# Patient Record
Sex: MALE | Race: WHITE | NOT HISPANIC OR LATINO | Employment: FULL TIME | URBAN - METROPOLITAN AREA
[De-identification: names, ages, dates, MRNs, and addresses within clinical notes are randomized per-mention and may not be internally consistent; named-entity substitution may affect disease eponyms.]

---

## 2017-03-18 ENCOUNTER — OFFICE VISIT (OUTPATIENT)
Dept: URGENT CARE | Facility: URGENT CARE | Age: 32
End: 2017-03-18
Payer: COMMERCIAL

## 2017-03-18 VITALS
OXYGEN SATURATION: 99 % | HEART RATE: 74 BPM | SYSTOLIC BLOOD PRESSURE: 113 MMHG | TEMPERATURE: 99.4 F | DIASTOLIC BLOOD PRESSURE: 70 MMHG | WEIGHT: 172.5 LBS

## 2017-03-18 DIAGNOSIS — K40.90 UNILATERAL INGUINAL HERNIA WITHOUT OBSTRUCTION OR GANGRENE, RECURRENCE NOT SPECIFIED: ICD-10-CM

## 2017-03-18 DIAGNOSIS — J02.9 SORE THROAT: Primary | ICD-10-CM

## 2017-03-18 DIAGNOSIS — J10.1 INFLUENZA DUE TO INFLUENZA VIRUS, TYPE B: ICD-10-CM

## 2017-03-18 LAB
DEPRECATED S PYO AG THROAT QL EIA: NORMAL
FLUAV+FLUBV AG SPEC QL: ABNORMAL
FLUAV+FLUBV AG SPEC QL: NEGATIVE
MICRO REPORT STATUS: NORMAL
SPECIMEN SOURCE: ABNORMAL
SPECIMEN SOURCE: NORMAL

## 2017-03-18 PROCEDURE — 87880 STREP A ASSAY W/OPTIC: CPT | Performed by: FAMILY MEDICINE

## 2017-03-18 PROCEDURE — 87081 CULTURE SCREEN ONLY: CPT | Performed by: FAMILY MEDICINE

## 2017-03-18 PROCEDURE — 87804 INFLUENZA ASSAY W/OPTIC: CPT | Performed by: FAMILY MEDICINE

## 2017-03-18 PROCEDURE — 99203 OFFICE O/P NEW LOW 30 MIN: CPT | Performed by: FAMILY MEDICINE

## 2017-03-18 RX ORDER — BENZONATATE 100 MG/1
100 CAPSULE ORAL 3 TIMES DAILY PRN
Qty: 42 CAPSULE | Refills: 0 | Status: SHIPPED | OUTPATIENT
Start: 2017-03-18 | End: 2017-04-06

## 2017-03-18 NOTE — MR AVS SNAPSHOT
After Visit Summary   3/18/2017    Raul Aviles    MRN: [id number]           Patient Information     Date Of Birth          1985        Visit Information        Provider Department      3/18/2017 10:00 AM Peter Hopson MD [facility]        Today's Diagnoses     Sore throat    -  1    Influenza due to influenza virus, type B        Unilateral inguinal hernia without obstruction or gangrene, recurrence not specified          Care Instructions      Influenza  Influenza ( the flu ) is an infection that affects your respiratory tract (the mouth, nose, and lungs, and the passages between them). Unlike a cold, the flu can make you very ill. And it can lead to pneumonia, a serious lung infection. For some people, especially older adults, young children, and people with certain chronic conditions, the flu can have serious complications and even be fatal.  What Are the Risk Factors for the Flu?     Viruses that cause influenza spread through the air in droplets when someone who has the flu coughs, sneezes, laughs, or talks.   Anyone can get the flu. But you re more likely to become infected if you:    Have a weakened immune system.    Work in a health care setting where you may be exposed to flu germs.    Live or work with someone who has the flu.    Haven t received an annual flu shot.  How Does the Flu Spread?  The flu is caused by viruses. The viruses spread through the air in droplets when someone who has the flu coughs, sneezes, laughs, or talks. You can become infected when you inhale these viruses directly. You can also become infected when you touch a surface on which the droplets have landed and then transfer the germs to your eyes, nose, or mouth. Touching used tissues, or sharing utensils, drinking glasses, or a toothbrush with an infected person can expose you to flu viruses, too.  What Are the Symptoms of the Flu?  Flu symptoms tend to come on quickly and may last a few days  to a few weeks. They include:    Fever usually higher than 101 F  (38.3 C) and chills    Sore throat and headache    Dry cough    Runny nose    Tiredness and weakness    Muscle aches  Factors That Can Make Flu Worse  For some people, the flu can be very serious. The risk of complications is greater for:    Children under age 5.    Adults 65 years of age and older.    People with a chronic illness, such as diabetes or heart, kidney, or lung disease.    People who live in a nursing home or long-term care facility.   How Is the Flu Treated?  Influenza usually improves after 7 days or so. In some cases, your health care provider may prescribe an antiviral medication. This may help you get well sooner. For the medication to help, you need to take it as soon as possible (ideally within 48 hours) after your symptoms start. If you develop pneumonia or other serious illness, hospital care may be needed.  Easing Flu Symptoms    Drink lots of fluids such as water, juice, and warm soup. A good rule is to drink enough so that you urinate your normal amount.    Get plenty of rest.    Ask your health care provider what to take for fever and pain.    Call your provider if your fever rises over 101 F (38.3 C) or you become dizzy, lightheaded, or short of breath.  Taking Steps to Protect Others    Wash your hands often, especially after coughing or sneezing. Or, clean your hands with an alcohol-based hand  containing at least 60 percent alcohol.    Cough or sneeze into a tissue. Then throw the tissue away and wash your hands. If you don t have a tissue, cough and sneeze into the crook of your elbow.    Stay home until at least 24 hours after you no longer have a fever or chills. Be sure the fever isn t being hidden by fever-reducing medication.    Don t share food, utensils, drinking glasses, or a toothbrush with others.    Ask your health care provider if others in your household should receive antiviral medication to help  them avoid infection.  How Can the Flu Be Prevented?    One of the best ways to avoid the flu is to get a flu vaccination each year. Viruses that cause the flu change from year to year. For that reason, doctors recommend getting the flu vaccine each year, as soon as it's available in your area. The vaccine may be given as a shot or as a nasal spray. Your health care provider can tell you which vaccine is right for you.    Wash your hands often. Frequent handwashing is a proven way to help prevent infection.    Carry an alcohol-based hand gel containing at least 60 percent alcohol. Use it when you don t have access to soap and water. Then wash your hands as soon as you can.    Avoid touching your eyes, nose, and mouth.    At home and work, clean phones, computer keyboards, and toys often with disinfectant wipes.    If possible, avoid close contact with others who have the flu or symptoms of the flu.  Handwashing Tips  Handwashing is one of the best ways to prevent many common infections. If you re caring for or visiting someone with the flu, wash your hands each time you enter and leave the room. Follow these steps:    Use warm water and plenty of soap. Rub your hands together well.    Clean the whole hand, under your nails, between your fingers, and up the wrists.    Wash for at least 15 seconds.    Rinse, letting the water run down your fingers, not up your wrists.    Dry your hands well. Use a paper towel to turn off the faucet and open the door.  Using Alcohol-Based Hand   Alcohol-based hand  are also a good choice. Use them when you don t have access to soap and water. Follow these steps:    Squeeze about a tablespoon of gel into the palm of one hand.    Rub your hands together briskly, cleaning the backs of your hands, the palms, between your fingers, and up the wrists.    Rub until the gel is gone and your hands are completely dry.  Preventing Influenza in Healthcare Settings  The flu is a  special concern for people in hospitals and long-term care facilities. To help prevent the spread of flu, many hospitals and nursing homes take these steps:    Health care providers wash their hands or use an alcohol-based hand  before and after treating each patient.    People with the flu have private rooms and bathrooms or share a room with someone with the same infection.    High-risk patients who don t have the flu are encouraged to get the flu and pneumonia vaccines.    All health care workers are encouraged or required to get flu shots.        9800-0147 [employer]. [address] [place] [address]. All rights reserved. This information is not intended as a substitute for professional medical care. Always follow your healthcare professional's instructions.        Patient Education    Benzonatate Oral capsule    Benzonatate Oral capsule, liquid filled  Benzonatate Oral capsule  What is this medicine?  BENZONATATE (brittaney ADRIÁN na colbert) is used to treat cough.  This medicine may be used for other purposes; ask your health care provider or pharmacist if you have questions.  What should I tell my health care provider before I take this medicine?  They need to know if you have any of these conditions:    kidney or liver disease    an unusual or allergic reaction to benzonatate, anesthetics, other medicines, foods, dyes, or preservatives    pregnant or trying to get pregnant    breast-feeding  How should I use this medicine?  Take this medicine by mouth with a glass of water. Follow the directions on the prescription label. Avoid breaking, chewing, or sucking the capsule, as this can cause serious side effects. Take your medicine at regular intervals. Do not take your medicine more often than directed.  Talk to your pediatrician regarding the use of this medicine in children. While this drug may be prescribed for children as young as 10 years old for selected conditions, precautions do  apply.  Overdosage: If you think you have taken too much of this medicine contact a poison control center or emergency room at once.  NOTE: This medicine is only for you. Do not share this medicine with others.  What if I miss a dose?  If you miss a dose, take it as soon as you can. If it is almost time for your next dose, take only that dose. Do not take double or extra doses.  What may interact with this medicine?  Do not take this medicine with any of the following medications:    MAOIs like Carbex, Eldepryl, Marplan, Nardil, and Parnate  This list may not describe all possible interactions. Give your health care provider a list of all the medicines, herbs, non-prescription drugs, or dietary supplements you use. Also tell them if you smoke, drink alcohol, or use illegal drugs. Some items may interact with your medicine.  What should I watch for while using this medicine?  Tell your doctor if your symptoms do not improve or if they get worse. If you have a high fever, skin rash, or headache, see your health care professional.  You may get drowsy or dizzy. Do not drive, use machinery, or do anything that needs mental alertness until you know how this medicine affects you. Do not sit or stand up quickly, especially if you are an older patient. This reduces the risk of dizzy or fainting spells.  What side effects may I notice from receiving this medicine?  Side effects that you should report to your doctor or health care professional as soon as possible:    allergic reactions like skin rash, itching or hives, swelling of the face, lips, or tongue    breathing problems    chest pain    confusion or hallucinations    irregular heartbeat    numbness of mouth or throat    seizures  Side effects that usually do not require medical attention (report to your doctor or health care professional if they continue or are bothersome):    burning feeling in the eyes    constipation    headache    nasal congestion    stomach  upset  This list may not describe all possible side effects. Call your doctor for medical advice about side effects. You may report side effects to FDA at 7-072-OAS-6612.  Where should I keep my medicine?  Keep out of the reach of children.  Store at room temperature between 15 and 30 degrees C (59 and 86 degrees F). Keep tightly closed. Protect from light and moisture. Throw away any unused medicine after the expiration date.  NOTE:This sheet is a summary. It may not cover all possible information. If you have questions about this medicine, talk to your doctor, pharmacist, or health care provider. Copyright  2016 [employer] Standard        Hernia (Adult)    A hernia can happen when there is a weakness or defect in the wall of the abdomen or groin. Intestines or nearby tissues may move from their usual location and push through the weakness in the wall. This can cause a hernia (bulge) you may see or feel.  Causes and Risk Factors   A hernia may be present at birth. Or it may be caused by the wear and tear of daily living. Certain factors can make a hernia more likely. These can include:    Heavy lifting    Straining, whether from lifting, movement, or constipation    Chronic cough    Injury to the abdominal wall    Excess weight    Pregnancy    Prior surgery    Older age    Family history of hernia  Symptoms  Symptoms of a hernia may come on suddenly. Or they may appear slowly over time. Some common symptoms include:    Bulge in the groin area, around the navel, or in the scrotum (the bulge may get bigger when you stand and go away when you lie down)    Pain or pressure around the bulge    Pain during activities such as lifting, coughing, or sneezing    A feeling of weakness or pressure in the groin    Pain or swelling in the scrotum  Types of hernias  There are different types of hernia. The type you have depends on its location:    Inguinal: This type is in the groin or scrotum. It is more common in men.    Femoral:  This type is in the groin, upper thigh (where the leg bends), or labia. It is more common in women.    Ventral: This type is in the abdominal wall.    Umbilical: This type occurs around the navel (belly button).    Incisional: This type occurs at the site of a previous surgery.  The condition of the hernia can help determine how urgently it needs to be treated.    Reducible: It goes back in by itself, or it can be pushed back in.    Irreducible: It can t be pushed back in.    Incarcerated/Strangulated: The intestine is trapped (incarcerated). If this happens, you won t be able to push the bulge back in. If the incarcerated hernia isn t treated, it may become strangulated. This means the area loses blood supply and the tissue may die. This requires emergency surgery! Treatment is needed right away!  In most cases, a hernia will not heal on its own. Surgery is usually needed to repair the defect in the abdominal wall or groin. You ll be told more about surgery, if needed.  If your symptoms are not severe, treatment may sometimes be delayed. In such cases, regular follow-up visits with the provider will be needed. You ll be asked to keep track of your symptoms and to watch for signs of more serious problems. You may also be given guidelines similar to the home care instructions below.  Home Care  To help keep a hernia from getting worse, you may be advised to:    Avoid heavy lifting and straining as directed.    Take steps to prevent constipation, such as eating more fiber and drinking more water. This may help reduce straining that can occur when having a bowel movement. Reducing straining may help keep your symptoms from getting worse.    Maintain a healthy weight or lose excess weight. This can help reduce strain on abdominal muscles and tissues.    Stop smoking. This can help prevent coughing that may also strain abdominal muscles and tissues.  Follow-up care  Follow up with your healthcare provider, or as  directed. If imaging tests were done, they will be reviewed a doctor. You will be told the results and any new findings that may affect your care.  When to seek medical advice  Call your healthcare provider right away if any of these occur:    Hernia hardens, swells, or grows larger    Hernia can no longer be pushed back in    Pain moves to the lower right abdomen (just below the waistline), or spreads to the back  Call 911  Call 911 right away if any of these occur:    Nausea and vomiting    Severe pain, redness, or tenderness in the area near the hernia    Pain worsens quickly and doesn t get better    Inability to have a bowel movement or pass gas    Fever of 100.4 F (38 C) or higher    Trouble breathing    Fainting    Rapid heart rate    Vomiting blood    Large amounts of blood in stool    9646-7664 [employer] [facility]. [address] [place] [address], [place] [address]. All rights reserved. This information is not intended as a substitute for professional medical care. Always follow your healthcare professional's instructions.              Follow-ups after your visit        Additional Services     GENERAL SURG ADULT REFERRAL       Your provider has referred you to: [facility]: [facility] [phone]   [email or web address]    Please be aware that coverage of these services is subject to the terms and limitations of your health insurance plan.  Call member services at your health plan with any benefit or coverage questions.      Please bring the following with you to your appointment:    (1) Any X-Rays, CTs or MRIs which have been performed.  Contact the facility where they were done to arrange for  prior to your scheduled appointment.   (2) List of current medications   (3) This referral request   (4) Any documents/labs given to you for this referral                  Who to contact     If you have questions or need follow up information about today's clinic visit or  "your schedule please contact [facility] directly at [phone].  Normal or non-critical lab and imaging results will be communicated to you by MyChart, letter or phone within 4 business days after the clinic has received the results. If you do not hear from us within 7 days, please contact the clinic through MyChart or phone. If you have a critical or abnormal lab result, we will notify you by phone as soon as possible.  Submit refill requests through [employer] or call your pharmacy and they will forward the refill request to us. Please allow 3 business days for your refill to be completed.          Additional Information About Your Visit        [employer] Information     [employer] lets you send messages to your doctor, view your test results, renew your prescriptions, schedule appointments and more. To sign up, go to [email or web address] . Click on \"Log in\" on the left side of the screen, which will take you to the Welcome page. Then click on \"Sign up Now\" on the right side of the page.     You will be asked to enter the access code listed below, as well as some personal information. Please follow the directions to create your username and password.     Your access code is: [email or web address]  Expires: 2017 10:56 AM     Your access code will  in 90 days. If you need help or a new code, please call your [place] clinic or [phone].        Care EveryWhere ID     This is your Care EveryWhere ID. This could be used by other organizations to access your [place] medical records  [id number]        Your Vitals Were     Pulse Temperature Pulse Oximetry             74 99.4  F (37.4  C) (Tympanic) 99%          Blood Pressure from Last 3 Encounters:   17 113/70    Weight from Last 3 Encounters:   17 172 lb 8 oz (78.2 kg)              We Performed the Following     Beta strep group A culture     GENERAL SURG ADULT REFERRAL     Influenza A/B antigen     Rapid strep screen          Today's " Medication Changes          These changes are accurate as of: 3/18/17 10:56 AM.  If you have any questions, ask your nurse or doctor.               Start taking these medicines.        Dose/Directions    benzonatate 100 MG capsule   Commonly known as:  TESSALON   Used for:  Influenza due to influenza virus, type B   Started by:  Peter Hopson MD        Dose:  100 mg   Take 1 capsule (100 mg) by mouth 3 times daily as needed for cough   Quantity:  42 capsule   Refills:  0            Where to get your medicines      These medications were sent to [id number] - [place], [place] - [address] [email or web address] [facility]  [address] [place] [address]    Hours:  24-hours Phone:  [phone]     benzonatate 100 MG capsule                Primary Care Provider Office Phone # Fax #    [facility] [phone] [address] [address] [facility]. [facility] [address]        Thank you!     Thank you for choosing [facility]  for your care. Our goal is always to provide you with excellent care. Hearing back from our patients is one way we can continue to improve our services. Please take a few minutes to complete the written survey that you may receive in the mail after your visit with us. Thank you!             Your Updated Medication List - Protect others around you: Learn how to safely use, store and throw away your medicines at www.disposemymeds.org.          This list is accurate as of: 3/18/17 10:56 AM.  Always use your most recent med list.                   Brand Name Dispense Instructions for use    benzonatate 100 MG capsule    TESSALON    42 capsule    Take 1 capsule (100 mg) by mouth 3 times daily as needed for cough

## 2017-03-18 NOTE — NURSING NOTE
Chief Complaint   Patient presents with     Sick       Initial /70  Pulse 74  Temp 99.4  F (37.4  C) (Tympanic)  Wt 172 lb 8 oz (78.2 kg)  SpO2 99% There is no height or weight on file to calculate BMI.  Medication Reconciliation: complete       KARTHIKEYAN Barrera

## 2017-03-18 NOTE — LETTER
[facility]  [address] [facility] [place] [address]        March 20, 2017    Raul Aviles  [address]  [facility] [place] [address]            Dear Raul,    The results of your recent throat culture was normal.  Below is a copy of the results.  It was a pleasure to see you at your last appointment.    If you have any questions or concerns, please call myself or my nurse at [phone].    Sincerely,    Peter Hopson MD/ks    Results for orders placed or performed in visit on 03/18/17   Influenza A/B antigen   Result Value Ref Range    Influenza A/B Agn Specimen Nasopharyngeal     Influenza A Negative NEG    Influenza B (A) NEG     Positive   Test results must be correlated with clinical data. If necessary, results   should be confirmed by a molecular assay or viral culture.     Rapid strep screen   Result Value Ref Range    Specimen Description Throat     Rapid Strep A Screen       NEGATIVE: No Group A streptococcal antigen detected by immunoassay, await   culture report.      Micro Report Status FINAL 03/18/2017    Beta strep group A culture   Result Value Ref Range    Specimen Description Throat     Culture Micro No Beta Streptococcus isolated     Micro Report Status FINAL 03/20/2017

## 2017-03-18 NOTE — PROGRESS NOTES
SUBJECTIVE:                                                    Raul Aviles is a 31 year old male who presents to clinic today for the following health issues:      RESPIRATORY SYMPTOMS      Duration: 1 week     Description  Cough, headache, sore throat, body aches, chills, sweats, fatigue, weak    Severity: moderate    Accompanying signs and symptoms: None    History (predisposing factors):  none    Precipitating or alleviating factors: daughter is sick, sx's are worse in the am and pm    Therapies tried and outcome:  Ibuprofen with temporary relief        He also complains of left inguinal bulge which he is noticing for few months but worsened during the last 5 days. He denies any bowel, bladder or other relevant systemic symptoms. He has not seen any doctor for about 3 years.       Problem list and histories reviewed & adjusted, as indicated.  Additional history: as documented    There is no problem list on file for this patient.    No past surgical history on file.    Social History   Substance Use Topics     Smoking status: Never Smoker     Smokeless tobacco: Not on file     Alcohol use Not on file     No family history on file.      No current outpatient prescriptions on file.     No Known Allergies  No lab results found.   BP Readings from Last 3 Encounters:   03/18/17 113/70    Wt Readings from Last 3 Encounters:   03/18/17 172 lb 8 oz (78.2 kg)                  Labs reviewed in EPIC    ROS:  10 point ROS of systems including Constitutional, Eyes, Respiratory, Cardiovascular, Gastroenterology, Genitourinary, Integumentary, Muscularskeletal, Psychiatric were all negative except for pertinent positives noted in my HPI.    OBJECTIVE:                                                    /70  Pulse 74  Temp 99.4  F (37.4  C) (Tympanic)  Wt 172 lb 8 oz (78.2 kg)  SpO2 99%  There is no height or weight on file to calculate BMI.  GENERAL: healthy, alert and no distress  EYES: Eyes grossly normal to  inspection, PERRL and conjunctivae and sclerae normal  HENT: ear canals and TM's normal, nose and mouth without ulcers or lesions  NECK: no adenopathy, no asymmetry, masses, or scars and thyroid normal to palpation  RESP: lungs clear to auscultation - no rales, rhonchi or wheezes  CV: regular rates and rhythm, normal S1 S2, no S3 or S4 and no murmur, click or rub  ABDOMEN: soft, nontender, bowel sounds normal and hernia left inguinal, reducible   MS: no gross musculoskeletal defects noted, no edema    Diagnostic Test Results:  Results for orders placed or performed in visit on 03/18/17 (from the past 24 hour(s))   Influenza A/B antigen   Result Value Ref Range    Influenza A/B Agn Specimen Nasopharyngeal     Influenza A Negative NEG    Influenza B (A) NEG     Positive   Test results must be correlated with clinical data. If necessary, results   should be confirmed by a molecular assay or viral culture.     Rapid strep screen   Result Value Ref Range    Specimen Description Throat     Rapid Strep A Screen       NEGATIVE: No Group A streptococcal antigen detected by immunoassay, await   culture report.      Micro Report Status FINAL 03/18/2017         ASSESSMENT/PLAN:                                                        ICD-10-CM    1. Sore throat J02.9 Influenza A/B antigen     Rapid strep screen     Beta strep group A culture   2. Influenza due to influenza virus, type B J10.1    3. Unilateral inguinal hernia without obstruction or gangrene, recurrence not specified K40.90      Discussed in detail differentials and further management. Cold like symptoms are secondary to influenza B, antiviral not indicated. Recommended well hydration, over-the-counter analgesia, warm fluids and rest. Tessalon prescribed for cough control. General surgery referral placed for left inguinal hernia. Suggested to avoid lifting heavy weights and constipation. Written instructions/information provided. Patient understood and in agreement  with the above plan. All questions are answered. Suggested to establish with PCP at earliest convenience.          MEDICATIONS:   Orders Placed This Encounter   Medications     benzonatate (TESSALON) 100 MG capsule     Sig: Take 1 capsule (100 mg) by mouth 3 times daily as needed for cough     Dispense:  42 capsule     Refill:  0          - Continue other medications without change  Patient Instructions       Influenza  Influenza ( the flu ) is an infection that affects your respiratory tract (the mouth, nose, and lungs, and the passages between them). Unlike a cold, the flu can make you very ill. And it can lead to pneumonia, a serious lung infection. For some people, especially older adults, young children, and people with certain chronic conditions, the flu can have serious complications and even be fatal.  What Are the Risk Factors for the Flu?     Viruses that cause influenza spread through the air in droplets when someone who has the flu coughs, sneezes, laughs, or talks.   Anyone can get the flu. But you re more likely to become infected if you:    Have a weakened immune system.    Work in a health care setting where you may be exposed to flu germs.    Live or work with someone who has the flu.    Haven t received an annual flu shot.  How Does the Flu Spread?  The flu is caused by viruses. The viruses spread through the air in droplets when someone who has the flu coughs, sneezes, laughs, or talks. You can become infected when you inhale these viruses directly. You can also become infected when you touch a surface on which the droplets have landed and then transfer the germs to your eyes, nose, or mouth. Touching used tissues, or sharing utensils, drinking glasses, or a toothbrush with an infected person can expose you to flu viruses, too.  What Are the Symptoms of the Flu?  Flu symptoms tend to come on quickly and may last a few days to a few weeks. They include:    Fever usually higher than 101 F   (38.3 C) and chills    Sore throat and headache    Dry cough    Runny nose    Tiredness and weakness    Muscle aches  Factors That Can Make Flu Worse  For some people, the flu can be very serious. The risk of complications is greater for:    Children under age 5.    Adults 65 years of age and older.    People with a chronic illness, such as diabetes or heart, kidney, or lung disease.    People who live in a nursing home or long-term care facility.   How Is the Flu Treated?  Influenza usually improves after 7 days or so. In some cases, your health care provider may prescribe an antiviral medication. This may help you get well sooner. For the medication to help, you need to take it as soon as possible (ideally within 48 hours) after your symptoms start. If you develop pneumonia or other serious illness, hospital care may be needed.  Easing Flu Symptoms    Drink lots of fluids such as water, juice, and warm soup. A good rule is to drink enough so that you urinate your normal amount.    Get plenty of rest.    Ask your health care provider what to take for fever and pain.    Call your provider if your fever rises over 101 F (38.3 C) or you become dizzy, lightheaded, or short of breath.  Taking Steps to Protect Others    Wash your hands often, especially after coughing or sneezing. Or, clean your hands with an alcohol-based hand  containing at least 60 percent alcohol.    Cough or sneeze into a tissue. Then throw the tissue away and wash your hands. If you don t have a tissue, cough and sneeze into the crook of your elbow.    Stay home until at least 24 hours after you no longer have a fever or chills. Be sure the fever isn t being hidden by fever-reducing medication.    Don t share food, utensils, drinking glasses, or a toothbrush with others.    Ask your health care provider if others in your household should receive antiviral medication to help them avoid infection.  How Can the Flu Be Prevented?    One of the  best ways to avoid the flu is to get a flu vaccination each year. Viruses that cause the flu change from year to year. For that reason, doctors recommend getting the flu vaccine each year, as soon as it's available in your area. The vaccine may be given as a shot or as a nasal spray. Your health care provider can tell you which vaccine is right for you.    Wash your hands often. Frequent handwashing is a proven way to help prevent infection.    Carry an alcohol-based hand gel containing at least 60 percent alcohol. Use it when you don t have access to soap and water. Then wash your hands as soon as you can.    Avoid touching your eyes, nose, and mouth.    At home and work, clean phones, computer keyboards, and toys often with disinfectant wipes.    If possible, avoid close contact with others who have the flu or symptoms of the flu.  Handwashing Tips  Handwashing is one of the best ways to prevent many common infections. If you re caring for or visiting someone with the flu, wash your hands each time you enter and leave the room. Follow these steps:    Use warm water and plenty of soap. Rub your hands together well.    Clean the whole hand, under your nails, between your fingers, and up the wrists.    Wash for at least 15 seconds.    Rinse, letting the water run down your fingers, not up your wrists.    Dry your hands well. Use a paper towel to turn off the faucet and open the door.  Using Alcohol-Based Hand   Alcohol-based hand  are also a good choice. Use them when you don t have access to soap and water. Follow these steps:    Squeeze about a tablespoon of gel into the palm of one hand.    Rub your hands together briskly, cleaning the backs of your hands, the palms, between your fingers, and up the wrists.    Rub until the gel is gone and your hands are completely dry.  Preventing Influenza in Healthcare Settings  The flu is a special concern for people in hospitals and long-term care facilities.  To help prevent the spread of flu, many hospitals and nursing homes take these steps:    Health care providers wash their hands or use an alcohol-based hand  before and after treating each patient.    People with the flu have private rooms and bathrooms or share a room with someone with the same infection.    High-risk patients who don t have the flu are encouraged to get the flu and pneumonia vaccines.    All health care workers are encouraged or required to get flu shots.        5486-5557 [employer]. [address] [place] [address]. All rights reserved. This information is not intended as a substitute for professional medical care. Always follow your healthcare professional's instructions.        Patient Education    Benzonatate Oral capsule    Benzonatate Oral capsule, liquid filled  Benzonatate Oral capsule  What is this medicine?  BENZONATATE (brittaney ADRIÁN na colbert) is used to treat cough.  This medicine may be used for other purposes; ask your health care provider or pharmacist if you have questions.  What should I tell my health care provider before I take this medicine?  They need to know if you have any of these conditions:    kidney or liver disease    an unusual or allergic reaction to benzonatate, anesthetics, other medicines, foods, dyes, or preservatives    pregnant or trying to get pregnant    breast-feeding  How should I use this medicine?  Take this medicine by mouth with a glass of water. Follow the directions on the prescription label. Avoid breaking, chewing, or sucking the capsule, as this can cause serious side effects. Take your medicine at regular intervals. Do not take your medicine more often than directed.  Talk to your pediatrician regarding the use of this medicine in children. While this drug may be prescribed for children as young as 10 years old for selected conditions, precautions do apply.  Overdosage: If you think you have taken too much of this medicine  contact a poison control center or emergency room at once.  NOTE: This medicine is only for you. Do not share this medicine with others.  What if I miss a dose?  If you miss a dose, take it as soon as you can. If it is almost time for your next dose, take only that dose. Do not take double or extra doses.  What may interact with this medicine?  Do not take this medicine with any of the following medications:    MAOIs like Carbex, Eldepryl, Marplan, Nardil, and Parnate  This list may not describe all possible interactions. Give your health care provider a list of all the medicines, herbs, non-prescription drugs, or dietary supplements you use. Also tell them if you smoke, drink alcohol, or use illegal drugs. Some items may interact with your medicine.  What should I watch for while using this medicine?  Tell your doctor if your symptoms do not improve or if they get worse. If you have a high fever, skin rash, or headache, see your health care professional.  You may get drowsy or dizzy. Do not drive, use machinery, or do anything that needs mental alertness until you know how this medicine affects you. Do not sit or stand up quickly, especially if you are an older patient. This reduces the risk of dizzy or fainting spells.  What side effects may I notice from receiving this medicine?  Side effects that you should report to your doctor or health care professional as soon as possible:    allergic reactions like skin rash, itching or hives, swelling of the face, lips, or tongue    breathing problems    chest pain    confusion or hallucinations    irregular heartbeat    numbness of mouth or throat    seizures  Side effects that usually do not require medical attention (report to your doctor or health care professional if they continue or are bothersome):    burning feeling in the eyes    constipation    headache    nasal congestion    stomach upset  This list may not describe all possible side effects. Call your doctor  for medical advice about side effects. You may report side effects to FDA at 7-482-ZRK-8596.  Where should I keep my medicine?  Keep out of the reach of children.  Store at room temperature between 15 and 30 degrees C (59 and 86 degrees F). Keep tightly closed. Protect from light and moisture. Throw away any unused medicine after the expiration date.  NOTE:This sheet is a summary. It may not cover all possible information. If you have questions about this medicine, talk to your doctor, pharmacist, or health care provider. Copyright  2016 [employer]        Hernia (Adult)    A hernia can happen when there is a weakness or defect in the wall of the abdomen or groin. Intestines or nearby tissues may move from their usual location and push through the weakness in the wall. This can cause a hernia (bulge) you may see or feel.  Causes and Risk Factors   A hernia may be present at birth. Or it may be caused by the wear and tear of daily living. Certain factors can make a hernia more likely. These can include:    Heavy lifting    Straining, whether from lifting, movement, or constipation    Chronic cough    Injury to the abdominal wall    Excess weight    Pregnancy    Prior surgery    Older age    Family history of hernia  Symptoms  Symptoms of a hernia may come on suddenly. Or they may appear slowly over time. Some common symptoms include:    Bulge in the groin area, around the navel, or in the scrotum (the bulge may get bigger when you stand and go away when you lie down)    Pain or pressure around the bulge    Pain during activities such as lifting, coughing, or sneezing    A feeling of weakness or pressure in the groin    Pain or swelling in the scrotum  Types of hernias  There are different types of hernia. The type you have depends on its location:    Inguinal: This type is in the groin or scrotum. It is more common in men.    Femoral: This type is in the groin, upper thigh (where the leg bends), or labia. It is  more common in women.    Ventral: This type is in the abdominal wall.    Umbilical: This type occurs around the navel (belly button).    Incisional: This type occurs at the site of a previous surgery.  The condition of the hernia can help determine how urgently it needs to be treated.    Reducible: It goes back in by itself, or it can be pushed back in.    Irreducible: It can t be pushed back in.    Incarcerated/Strangulated: The intestine is trapped (incarcerated). If this happens, you won t be able to push the bulge back in. If the incarcerated hernia isn t treated, it may become strangulated. This means the area loses blood supply and the tissue may die. This requires emergency surgery! Treatment is needed right away!  In most cases, a hernia will not heal on its own. Surgery is usually needed to repair the defect in the abdominal wall or groin. You ll be told more about surgery, if needed.  If your symptoms are not severe, treatment may sometimes be delayed. In such cases, regular follow-up visits with the provider will be needed. You ll be asked to keep track of your symptoms and to watch for signs of more serious problems. You may also be given guidelines similar to the home care instructions below.  Home Care  To help keep a hernia from getting worse, you may be advised to:    Avoid heavy lifting and straining as directed.    Take steps to prevent constipation, such as eating more fiber and drinking more water. This may help reduce straining that can occur when having a bowel movement. Reducing straining may help keep your symptoms from getting worse.    Maintain a healthy weight or lose excess weight. This can help reduce strain on abdominal muscles and tissues.    Stop smoking. This can help prevent coughing that may also strain abdominal muscles and tissues.  Follow-up care  Follow up with your healthcare provider, or as directed. If imaging tests were done, they will be reviewed a doctor. You will be told  the results and any new findings that may affect your care.  When to seek medical advice  Call your healthcare provider right away if any of these occur:    Hernia hardens, swells, or grows larger    Hernia can no longer be pushed back in    Pain moves to the lower right abdomen (just below the waistline), or spreads to the back  Call 911  Call 911 right away if any of these occur:    Nausea and vomiting    Severe pain, redness, or tenderness in the area near the hernia    Pain worsens quickly and doesn t get better    Inability to have a bowel movement or pass gas    Fever of 100.4 F (38 C) or higher    Trouble breathing    Fainting    Rapid heart rate    Vomiting blood    Large amounts of blood in stool    [id number] [employer]. [address] [place] [address], [place] [id number]. All rights reserved. This information is not intended as a substitute for professional medical care. Always follow your healthcare professional's instructions.            Peter Hopson MD  [facility]

## 2017-03-18 NOTE — PATIENT INSTRUCTIONS
Influenza  Influenza ( the flu ) is an infection that affects your respiratory tract (the mouth, nose, and lungs, and the passages between them). Unlike a cold, the flu can make you very ill. And it can lead to pneumonia, a serious lung infection. For some people, especially older adults, young children, and people with certain chronic conditions, the flu can have serious complications and even be fatal.  What Are the Risk Factors for the Flu?     Viruses that cause influenza spread through the air in droplets when someone who has the flu coughs, sneezes, laughs, or talks.   Anyone can get the flu. But you re more likely to become infected if you:    Have a weakened immune system.    Work in a health care setting where you may be exposed to flu germs.    Live or work with someone who has the flu.    Haven t received an annual flu shot.  How Does the Flu Spread?  The flu is caused by viruses. The viruses spread through the air in droplets when someone who has the flu coughs, sneezes, laughs, or talks. You can become infected when you inhale these viruses directly. You can also become infected when you touch a surface on which the droplets have landed and then transfer the germs to your eyes, nose, or mouth. Touching used tissues, or sharing utensils, drinking glasses, or a toothbrush with an infected person can expose you to flu viruses, too.  What Are the Symptoms of the Flu?  Flu symptoms tend to come on quickly and may last a few days to a few weeks. They include:    Fever usually higher than 101 F  (38.3 C) and chills    Sore throat and headache    Dry cough    Runny nose    Tiredness and weakness    Muscle aches  Factors That Can Make Flu Worse  For some people, the flu can be very serious. The risk of complications is greater for:    Children under age 5.    Adults 65 years of age and older.    People with a chronic illness, such as diabetes or heart, kidney, or lung disease.    People who live in a nursing  home or long-term care facility.   How Is the Flu Treated?  Influenza usually improves after 7 days or so. In some cases, your health care provider may prescribe an antiviral medication. This may help you get well sooner. For the medication to help, you need to take it as soon as possible (ideally within 48 hours) after your symptoms start. If you develop pneumonia or other serious illness, hospital care may be needed.  Easing Flu Symptoms    Drink lots of fluids such as water, juice, and warm soup. A good rule is to drink enough so that you urinate your normal amount.    Get plenty of rest.    Ask your health care provider what to take for fever and pain.    Call your provider if your fever rises over 101 F (38.3 C) or you become dizzy, lightheaded, or short of breath.  Taking Steps to Protect Others    Wash your hands often, especially after coughing or sneezing. Or, clean your hands with an alcohol-based hand  containing at least 60 percent alcohol.    Cough or sneeze into a tissue. Then throw the tissue away and wash your hands. If you don t have a tissue, cough and sneeze into the crook of your elbow.    Stay home until at least 24 hours after you no longer have a fever or chills. Be sure the fever isn t being hidden by fever-reducing medication.    Don t share food, utensils, drinking glasses, or a toothbrush with others.    Ask your health care provider if others in your household should receive antiviral medication to help them avoid infection.  How Can the Flu Be Prevented?    One of the best ways to avoid the flu is to get a flu vaccination each year. Viruses that cause the flu change from year to year. For that reason, doctors recommend getting the flu vaccine each year, as soon as it's available in your area. The vaccine may be given as a shot or as a nasal spray. Your health care provider can tell you which vaccine is right for you.    Wash your hands often. Frequent handwashing is a proven way  to help prevent infection.    Carry an alcohol-based hand gel containing at least 60 percent alcohol. Use it when you don t have access to soap and water. Then wash your hands as soon as you can.    Avoid touching your eyes, nose, and mouth.    At home and work, clean phones, computer keyboards, and toys often with disinfectant wipes.    If possible, avoid close contact with others who have the flu or symptoms of the flu.  Handwashing Tips  Handwashing is one of the best ways to prevent many common infections. If you re caring for or visiting someone with the flu, wash your hands each time you enter and leave the room. Follow these steps:    Use warm water and plenty of soap. Rub your hands together well.    Clean the whole hand, under your nails, between your fingers, and up the wrists.    Wash for at least 15 seconds.    Rinse, letting the water run down your fingers, not up your wrists.    Dry your hands well. Use a paper towel to turn off the faucet and open the door.  Using Alcohol-Based Hand   Alcohol-based hand  are also a good choice. Use them when you don t have access to soap and water. Follow these steps:    Squeeze about a tablespoon of gel into the palm of one hand.    Rub your hands together briskly, cleaning the backs of your hands, the palms, between your fingers, and up the wrists.    Rub until the gel is gone and your hands are completely dry.  Preventing Influenza in Healthcare Settings  The flu is a special concern for people in hospitals and long-term care facilities. To help prevent the spread of flu, many hospitals and nursing homes take these steps:    Health care providers wash their hands or use an alcohol-based hand  before and after treating each patient.    People with the flu have private rooms and bathrooms or share a room with someone with the same infection.    High-risk patients who don t have the flu are encouraged to get the flu and pneumonia  vaccines.    All health care workers are encouraged or required to get flu shots.        5571-1411 [employer]. [address] [place] [address], [place] [address]. All rights reserved. This information is not intended as a substitute for professional medical care. Always follow your healthcare professional's instructions.        Patient Education    Benzonatate Oral capsule    Benzonatate Oral capsule, liquid filled  Benzonatate Oral capsule  What is this medicine?  BENZONATATE (brittaney ADRIÁN na colbert) is used to treat cough.  This medicine may be used for other purposes; ask your health care provider or pharmacist if you have questions.  What should I tell my health care provider before I take this medicine?  They need to know if you have any of these conditions:    kidney or liver disease    an unusual or allergic reaction to benzonatate, anesthetics, other medicines, foods, dyes, or preservatives    pregnant or trying to get pregnant    breast-feeding  How should I use this medicine?  Take this medicine by mouth with a glass of water. Follow the directions on the prescription label. Avoid breaking, chewing, or sucking the capsule, as this can cause serious side effects. Take your medicine at regular intervals. Do not take your medicine more often than directed.  Talk to your pediatrician regarding the use of this medicine in children. While this drug may be prescribed for children as young as 10 years old for selected conditions, precautions do apply.  Overdosage: If you think you have taken too much of this medicine contact a poison control center or emergency room at once.  NOTE: This medicine is only for you. Do not share this medicine with others.  What if I miss a dose?  If you miss a dose, take it as soon as you can. If it is almost time for your next dose, take only that dose. Do not take double or extra doses.  What may interact with this medicine?  Do not take this medicine with any of the following  medications:    MAOIs like Carbex, Eldepryl, Marplan, Nardil, and Parnate  This list may not describe all possible interactions. Give your health care provider a list of all the medicines, herbs, non-prescription drugs, or dietary supplements you use. Also tell them if you smoke, drink alcohol, or use illegal drugs. Some items may interact with your medicine.  What should I watch for while using this medicine?  Tell your doctor if your symptoms do not improve or if they get worse. If you have a high fever, skin rash, or headache, see your health care professional.  You may get drowsy or dizzy. Do not drive, use machinery, or do anything that needs mental alertness until you know how this medicine affects you. Do not sit or stand up quickly, especially if you are an older patient. This reduces the risk of dizzy or fainting spells.  What side effects may I notice from receiving this medicine?  Side effects that you should report to your doctor or health care professional as soon as possible:    allergic reactions like skin rash, itching or hives, swelling of the face, lips, or tongue    breathing problems    chest pain    confusion or hallucinations    irregular heartbeat    numbness of mouth or throat    seizures  Side effects that usually do not require medical attention (report to your doctor or health care professional if they continue or are bothersome):    burning feeling in the eyes    constipation    headache    nasal congestion    stomach upset  This list may not describe all possible side effects. Call your doctor for medical advice about side effects. You may report side effects to FDA at 2-240-FDA-1950.  Where should I keep my medicine?  Keep out of the reach of children.  Store at room temperature between 15 and 30 degrees C (59 and 86 degrees F). Keep tightly closed. Protect from light and moisture. Throw away any unused medicine after the expiration date.  NOTE:This sheet is a summary. It may not cover  all possible information. If you have questions about this medicine, talk to your doctor, pharmacist, or health care provider. Copyright  2016 [employer]        Hernia (Adult)    A hernia can happen when there is a weakness or defect in the wall of the abdomen or groin. Intestines or nearby tissues may move from their usual location and push through the weakness in the wall. This can cause a hernia (bulge) you may see or feel.  Causes and Risk Factors   A hernia may be present at birth. Or it may be caused by the wear and tear of daily living. Certain factors can make a hernia more likely. These can include:    Heavy lifting    Straining, whether from lifting, movement, or constipation    Chronic cough    Injury to the abdominal wall    Excess weight    Pregnancy    Prior surgery    Older age    Family history of hernia  Symptoms  Symptoms of a hernia may come on suddenly. Or they may appear slowly over time. Some common symptoms include:    Bulge in the groin area, around the navel, or in the scrotum (the bulge may get bigger when you stand and go away when you lie down)    Pain or pressure around the bulge    Pain during activities such as lifting, coughing, or sneezing    A feeling of weakness or pressure in the groin    Pain or swelling in the scrotum  Types of hernias  There are different types of hernia. The type you have depends on its location:    Inguinal: This type is in the groin or scrotum. It is more common in men.    Femoral: This type is in the groin, upper thigh (where the leg bends), or labia. It is more common in women.    Ventral: This type is in the abdominal wall.    Umbilical: This type occurs around the navel (belly button).    Incisional: This type occurs at the site of a previous surgery.  The condition of the hernia can help determine how urgently it needs to be treated.    Reducible: It goes back in by itself, or it can be pushed back in.    Irreducible: It can t be pushed back  in.    Incarcerated/Strangulated: The intestine is trapped (incarcerated). If this happens, you won t be able to push the bulge back in. If the incarcerated hernia isn t treated, it may become strangulated. This means the area loses blood supply and the tissue may die. This requires emergency surgery! Treatment is needed right away!  In most cases, a hernia will not heal on its own. Surgery is usually needed to repair the defect in the abdominal wall or groin. You ll be told more about surgery, if needed.  If your symptoms are not severe, treatment may sometimes be delayed. In such cases, regular follow-up visits with the provider will be needed. You ll be asked to keep track of your symptoms and to watch for signs of more serious problems. You may also be given guidelines similar to the home care instructions below.  Home Care  To help keep a hernia from getting worse, you may be advised to:    Avoid heavy lifting and straining as directed.    Take steps to prevent constipation, such as eating more fiber and drinking more water. This may help reduce straining that can occur when having a bowel movement. Reducing straining may help keep your symptoms from getting worse.    Maintain a healthy weight or lose excess weight. This can help reduce strain on abdominal muscles and tissues.    Stop smoking. This can help prevent coughing that may also strain abdominal muscles and tissues.  Follow-up care  Follow up with your healthcare provider, or as directed. If imaging tests were done, they will be reviewed a doctor. You will be told the results and any new findings that may affect your care.  When to seek medical advice  Call your healthcare provider right away if any of these occur:    Hernia hardens, swells, or grows larger    Hernia can no longer be pushed back in    Pain moves to the lower right abdomen (just below the waistline), or spreads to the back  Call 911  Call 911 right away if any of these occur:    Nausea  and vomiting    Severe pain, redness, or tenderness in the area near the hernia    Pain worsens quickly and doesn t get better    Inability to have a bowel movement or pass gas    Fever of 100.4 F (38 C) or higher    Trouble breathing    Fainting    Rapid heart rate    Vomiting blood    Large amounts of blood in stool    4152-0635 [employer]. [address] [place] [address]. All rights reserved. This information is not intended as a substitute for professional medical care. Always follow your healthcare professional's instructions.

## 2017-03-20 LAB
BACTERIA SPEC CULT: NORMAL
MICRO REPORT STATUS: NORMAL
SPECIMEN SOURCE: NORMAL

## 2017-03-28 ENCOUNTER — OFFICE VISIT (OUTPATIENT)
Dept: SURGERY | Facility: CLINIC | Age: 32
End: 2017-03-28
Payer: COMMERCIAL

## 2017-03-28 VITALS
SYSTOLIC BLOOD PRESSURE: 121 MMHG | HEIGHT: 70 IN | HEART RATE: 70 BPM | BODY MASS INDEX: 25.62 KG/M2 | DIASTOLIC BLOOD PRESSURE: 57 MMHG | WEIGHT: 179 LBS

## 2017-03-28 DIAGNOSIS — K40.90 LEFT INGUINAL HERNIA: Primary | ICD-10-CM

## 2017-03-28 PROCEDURE — 99203 OFFICE O/P NEW LOW 30 MIN: CPT | Performed by: SURGERY

## 2017-03-28 NOTE — PATIENT INSTRUCTIONS
Assessment: left inguinal hernia    Plan to do left inguinal hernia repair with mesh open.    Risks of surgery include damage to nerves, bleeding, infection, damage to  Vessels, recurrence.  Although mesh is a better long term repair if it gets infected it must be removed.   If the patient has any bacterial infection the week before and is seen by their doctor and started on antibiotics, I can probably still do the surgery if they are vastly improved by the time of surgery, but if the infection starts closer to the surgery date it will be better to cancel and reschedule to a later date.  A cough will also be hard on the repair and uncomfortable post operative.  If the patient is a smoker I did discuss increase risk of recurrence and more pain with the cough.  If the patient is willing to quit smoking would encourage to do so and start at least a week before surgery.  However, if patient is not going to quit then must understand that his repair is more likely to fail.    Risks of surgery discussed including, but not limited to bleeding, infection, recurrence, damage to nerves and what is in the hernia sac.  Risks of anesthesia also discussed.    Discussed massaging hernia back in and using ice if becomes more painful.  If not able to reduce then go to emergency room.  Also discussed hernia belt to use until able to get in for surgery.    HERNIORRAPHY DISCHARGE INSTRUCTIONS  DR. ENRRIQUE BOSTON  1. You may resume your regular diet when you feel you are ready to. DO NOT drink alcoholic beverages for  24 hours of while you are taking prescription medication.  2. Limit your activities for the first 48 hours. Gradually, increase them as tolerated. You may use stairs.  I encourage you to walk as tolerated. No lifting greater that 20 pounds for __3__ weeks.  3. You will have some discomfort at the incision sites. This is expected. This should improve over the next  2-3 days. Ice and pain medication will help with this pain.  Use prescribed pain medication as instructed.  4. Bruising and mild swelling is normal after surgery. For males it is common to have bruising going into the  penis and scrotum. The area below and around the incision(s) will be hard and elevated. This is normal. I call  it the healing ridge. This will resolve slowly over the next several months. If you feel the pain is increasing  and cannot explain it by increasing activity please call us at [phone]  5. The dressing will often have some blood on it. You may shower 24 hours after surgery. Clean gently over  incision site. If clear plastic covering or steri-strip comes off and there is still some bleeding or drainage  then cover with gauze or band-aid. If no bleeding there is no reason to cover site. The abdominal binder  may be removed after 24 hours after surgery. You may continue to wear it however for comfort. I suggest  you wear an old [employer] shirt under the abdominal binder for a more comfortable wear.  6. Avoid Aspirin for the first 72 hours after the procedure. This medication may increase the tendency to bleed.  7. Use the following medications (in addition to your normal meds) as shown:  Name of Medicine Dose Frequency Reason  a. Percocet 5 mg 1-2 every 6 hours as needed for severe pain. This contains 325 mg of Tylenol (acetaminophen)  per tablet. For example, you may take 1 Percocet and 1 Tylenol, or 2 Percocet and no Tylenol,  or 2 Tylenol and no Percocet every 6 hours.  b. Tylenol (acetaminophen) 500 mg every 6 hours as needed for mild pain. Do not take more than 1000 mg  every 6 hours. (see above)  c. Motrin (ibuprophen) 200-800 mg every 6 hours as needed for mild to moderate pain. Take with food.  d. _________________________________________________________________________  8. [facility] [phone] if:    Your discomfort is not relieved by your pain medication    You have signs of infection such as temperature above 100.5  degrees orally, chills, or  increasing daily discomfort.    Incision site is becoming more red and/or there is purulent drainage.    You have questions or concerns  9. Please call [phone] to schedule a follow up appointment in about 2 weeks(s)  10. When taking narcotics (pain medication more than Tylenol (acetaminophen) and Motrin (ibuprophen) it is  important to keep your stools soft to avoid constipation and pain with straining. This is best done by drinking  fluids (nonalcoholic and non-caffeinated) and taking a stool softener i.e. Metamucil or milk of magnesia.  You may be able to use non-narcotics for pain relief especially by the 3rd post- operative day. [id number] mg  every 6 hours and/or Motrin (ibuprophen) 200-800 mg every 6 hours. Please do not take more than 4 grams  of Tylenol (acetaminophen) per day. Remember your Percocet does have Tylenol (acetaminophen) already  in it. If you have a history of stomach ulcers or stomach problems than do not take the Motrin (ibuprophen).  Please take Motrin (ibuprophen) with food to help protect the stomach.  11. Do not drive or operate heavy machinery for 24 hours after surgery or when taking narcotics. You may  resume driving when feel that you can safely avoid an accident and are not taking narcotics. This is usually  5 to 7 days after surgery. You should not be alone for 24 hours after surgery.    Have milk of magnesia available at home so that when you take the pain medications you take 1-2 teaspoons a day,  to help reduce problems with constipation.

## 2017-03-28 NOTE — MR AVS SNAPSHOT
After Visit Summary   3/28/2017    Raul Aviles    MRN: [id number]           Patient Information     Date Of Birth          1985        Visit Information        Provider Department      3/28/2017 9:00 AM Nelson Baca [place] [facility]    Assessment: left inguinal hernia    Plan to do left inguinal hernia repair with mesh open.    Risks of surgery include damage to nerves, bleeding, infection, damage to  Vessels, recurrence.  Although mesh is a better long term repair if it gets infected it must be removed.   If the patient has any bacterial infection the week before and is seen by their doctor and started on antibiotics, I can probably still do the surgery if they are vastly improved by the time of surgery, but if the infection starts closer to the surgery date it will be better to cancel and reschedule to a later date.  A cough will also be hard on the repair and uncomfortable post operative.  If the patient is a smoker I did discuss increase risk of recurrence and more pain with the cough.  If the patient is willing to quit smoking would encourage to do so and start at least a week before surgery.  However, if patient is not going to quit then must understand that his repair is more likely to fail.    Risks of surgery discussed including, but not limited to bleeding, infection, recurrence, damage to nerves and what is in the hernia sac.  Risks of anesthesia also discussed.    Discussed massaging hernia back in and using ice if becomes more painful.  If not able to reduce then go to emergency room.  Also discussed hernia belt to use until able to get in for surgery.    HERNIORRAPHY DISCHARGE INSTRUCTIONS  DR. NELSON BACA  1. You may resume your regular diet when you feel you are ready to. DO NOT drink alcoholic beverages for  24 hours of while you are taking prescription medication.  2. Limit your activities for the first 48 hours. Gradually,  increase them as tolerated. You may use stairs.  I encourage you to walk as tolerated. No lifting greater that 20 pounds for __3__ weeks.  3. You will have some discomfort at the incision sites. This is expected. This should improve over the next  2-3 days. Ice and pain medication will help with this pain. Use prescribed pain medication as instructed.  4. Bruising and mild swelling is normal after surgery. For males it is common to have bruising going into the  penis and scrotum. The area below and around the incision(s) will be hard and elevated. This is normal. I call  it the healing ridge. This will resolve slowly over the next several months. If you feel the pain is increasing  and cannot explain it by increasing activity please call us at [phone]  5. The dressing will often have some blood on it. You may shower 24 hours after surgery. Clean gently over  incision site. If clear plastic covering or steri-strip comes off and there is still some bleeding or drainage  then cover with gauze or band-aid. If no bleeding there is no reason to cover site. The abdominal binder  may be removed after 24 hours after surgery. You may continue to wear it however for comfort. I suggest  you wear an old [employer] shirt under the abdominal binder for a more comfortable wear.  6. Avoid Aspirin for the first 72 hours after the procedure. This medication may increase the tendency to bleed.  7. Use the following medications (in addition to your normal meds) as shown:  Name of Medicine Dose Frequency Reason  a. Percocet 5 mg 1-2 every 6 hours as needed for severe pain. This contains 325 mg of Tylenol (acetaminophen)  per tablet. For example, you may take 1 Percocet and 1 Tylenol, or 2 Percocet and no Tylenol,  or 2 Tylenol and no Percocet every 6 hours.  b. Tylenol (acetaminophen) 500 mg every 6 hours as needed for mild pain. Do not take more than 1000 mg  every 6 hours. (see above)  c. Motrin (ibuprophen) 200-800 mg every 6 hours as  needed for mild to moderate pain. Take with food.  d. _________________________________________________________________________  8. [facility] at [phone] if:    Your discomfort is not relieved by your pain medication    You have signs of infection such as temperature above 100.5 degrees orally, chills, or  increasing daily discomfort.    Incision site is becoming more red and/or there is purulent drainage.    You have questions or concerns  9. Please call [phone] to schedule a follow up appointment in about 2 weeks(s)  10. When taking narcotics (pain medication more than Tylenol (acetaminophen) and Motrin (ibuprophen) it is  important to keep your stools soft to avoid constipation and pain with straining. This is best done by drinking  fluids (nonalcoholic and non-caffeinated) and taking a stool softener i.e. Metamucil or milk of magnesia.  You may be able to use non-narcotics for pain relief especially by the 3rd post- operative day. [id number] mg  every 6 hours and/or Motrin (ibuprophen) 200-800 mg every 6 hours. Please do not take more than 4 grams  of Tylenol (acetaminophen) per day. Remember your Percocet does have Tylenol (acetaminophen) already  in it. If you have a history of stomach ulcers or stomach problems than do not take the Motrin (ibuprophen).  Please take Motrin (ibuprophen) with food to help protect the stomach.  11. Do not drive or operate heavy machinery for 24 hours after surgery or when taking narcotics. You may  resume driving when feel that you can safely avoid an accident and are not taking narcotics. This is usually  5 to 7 days after surgery. You should not be alone for 24 hours after surgery.    Have milk of magnesia available at home so that when you take the pain medications you take 1-2 teaspoons a day,  to help reduce problems with constipation.              Follow-ups after your visit        Who to contact     If you have questions or need follow up  "information about today's clinic visit or your schedule please contact [facility] directly at [phone].  Normal or non-critical lab and imaging results will be communicated to you by MyChart, letter or phone within 4 business days after the clinic has received the results. If you do not hear from us within 7 days, please contact the clinic through MyChart or phone. If you have a critical or abnormal lab result, we will notify you by phone as soon as possible.  Submit refill requests through [facility] or call your pharmacy and they will forward the refill request to us. Please allow 3 business days for your refill to be completed.          Additional Information About Your Visit        MyChart Information     [facility] lets you send messages to your doctor, view your test results, renew your prescriptions, schedule appointments and more. To sign up, go to [email or web address] . Click on \"Log in\" on the left side of the screen, which will take you to the Welcome page. Then click on \"Sign up Now\" on the right side of the page.     You will be asked to enter the access code listed below, as well as some personal information. Please follow the directions to create your username and password.     Your access code is: [email or web address]  Expires: 2017 10:56 AM     Your access code will  in 90 days. If you need help or a new code, please call your [facility] clinic or [phone].        Care EveryWhere ID     This is your Care EveryWhere ID. This could be used by other organizations to access your [facility] medical records  [id number]        Your Vitals Were     Pulse Height BMI (Body Mass Index)             70 5' 10\" (1.778 m) 25.68 kg/m2          Blood Pressure from Last 3 Encounters:   17 121/57   17 113/70    Weight from Last 3 Encounters:   17 179 lb (81.2 kg)   17 172 lb 8 oz (78.2 kg)              Today, you had the following     No orders found for display       Primary " Care Provider Office Phone # Fax #    [facility] [phone] [address] [facility] [address]        Thank you!     Thank you for choosing [facility]  for your care. Our goal is always to provide you with excellent care. Hearing back from our patients is one way we can continue to improve our services. Please take a few minutes to complete the written survey that you may receive in the mail after your visit with us. Thank you!             Your Updated Medication List - Protect others around you: Learn how to safely use, store and throw away your medicines at www.disposemymeds.org.          This list is accurate as of: 3/28/17  9:23 AM.  Always use your most recent med list.                   Brand Name Dispense Instructions for use    benzonatate 100 MG capsule    TESSALON    42 capsule    Take 1 capsule (100 mg) by mouth 3 times daily as needed for cough

## 2017-03-28 NOTE — NURSING NOTE
"Chief Complaint   Patient presents with     Hernia     LIH       Initial /57  Pulse 70  Ht 5' 10\" (1.778 m)  Wt 179 lb (81.2 kg)  BMI 25.68 kg/m2 Estimated body mass index is 25.68 kg/(m^2) as calculated from the following:    Height as of this encounter: 5' 10\" (1.778 m).    Weight as of this encounter: 179 lb (81.2 kg).  Medication Reconciliation: hope Kaur Cma      "

## 2017-03-28 NOTE — PROGRESS NOTES
"Dear Dr. Peetr Hopson  I was asked to see this patient by Dr. Peter Hopson for please see below.  I have seen Raul Aviles and as you know his chief complaint is left groin pain and can see a bulge.  Has ahd for 5 months after a cough and last 3 weeks has been more painful.    Denies nausea, vomitting, diahrrea, constipation.   Non smoker  No bladder outlet obstruction symptoms     HPI:  Patient is a 31 year old male  with complaints left groin pain  The patient noticed the symptoms about 5 months ago.    Patient has not family history of hernia problems  Laying down makes the episode better.      Review Of Systems  Respiratory: No shortness of breath, dyspnea on exertion, cough, or hemoptysis  Cardiovascular: negative  Gastrointestinal: negative  Endocrine: negative  :  negative  /57  Pulse 70  Ht 5' 10\" (1.778 m)  Wt 179 lb (81.2 kg)  BMI 25.68 kg/m2    No past medical history on file.    No past surgical history on file.    Social History     Social History     Marital status: Single     Spouse name: N/A     Number of children: N/A     Years of education: N/A     Occupational History     Not on file.     Social History Main Topics     Smoking status: Never Smoker     Smokeless tobacco: Not on file     Alcohol use Not on file     Drug use: Not on file     Sexual activity: Not on file     Other Topics Concern     Not on file     Social History Narrative       Current Outpatient Prescriptions   Medication Sig Dispense Refill     benzonatate (TESSALON) 100 MG capsule Take 1 capsule (100 mg) by mouth 3 times daily as needed for cough (Patient not taking: Reported on 3/28/2017) 42 capsule 0       10 Point review of systems all others are negative.   Above was reviewed  Physical exam: /57  Pulse 70  Ht 5' 10\" (1.778 m)  Wt 179 lb (81.2 kg)  BMI 25.68 kg/m2   Patient able to get up on table without difficulty.   Patient is alert and orientated.   Head eyes, nose and mouth within normal limits.  No " supraclavicular or cervical adenopathy palpated.  Thyroid within normal limits.  No carotid bruits auscultated.  Lungs are clear to auscultation  Heart is regular rate and rhythm with no murmur or thrills noted.  No costal vertebral angle tenderness noted.  Abdomen is abdomen is soft without significant tenderness, masses, organomegaly or guarding  bowel sounds are positive and no caput medusa noted.  Has obvious left inguinal hernia no right inguinal hernia or umbilical hernias noted.   Testicles are normal.    Skin was warm and pink  Normal Affect    Easily palpable posterior tibial pulse or dorsalis pedis pulse bilaterally.  Lower extremity edema is not present.      Assessment: left inguinal hernia    Plan to do left inguinal hernia repair with mesh open.    Risks of surgery include damage to nerves, bleeding, infection, damage to  Vessels, recurrence.  Although mesh is a better long term repair if it gets infected it must be removed.   If the patient has any bacterial infection the week before and is seen by their doctor and started on antibiotics, I can probably still do the surgery if they are vastly improved by the time of surgery, but if the infection starts closer to the surgery date it will be better to cancel and reschedule to a later date.  A cough will also be hard on the repair and uncomfortable post operative.  If the patient is a smoker I did discuss increase risk of recurrence and more pain with the cough.  If the patient is willing to quit smoking would encourage to do so and start at least a week before surgery.  However, if patient is not going to quit then must understand that his repair is more likely to fail.    Risks of surgery discussed including, but not limited to bleeding, infection, recurrence, damage to nerves and what is in the hernia sac.  Risks of anesthesia also discussed.    Discussed massaging hernia back in and using ice if becomes more painful.  If not able to reduce then go to  emergency room.  Also discussed hernia belt to use until able to get in for surgery.    Time spent with the patient with greater that 50% of the time in discussion was 30 minutes.  In discussing the hernia and options for waiting. .      Nelson Baca MD

## 2017-03-30 NOTE — PROGRESS NOTES
[facility]  [address] [facility] [address]  [phone]  Dept: [phone]    PRE-OP EVALUATION:  Today's date: 2017    Raul Aviles (: 1985) presents for pre-operative evaluation assessment as requested by Dr. Baca.  He requires evaluation and anesthesia risk assessment prior to undergoing surgery/procedure for treatment of Hernia repair .  Proposed procedure: HERNIORRHAPHY INGUINAL--left    Date of Surgery/ Procedure: 2017  Time of Surgery/ Procedure: 11:00am  [place]ospital[place] [facility] [place]   Fax number for surgical facility: [phone]  Primary Physician: [facility]  Type of Anesthesia Anticipated: General    Patient has a Health Care Directive or Living Will:  NO    1. NO - Do you have a history of heart attack, stroke, stent, bypass or surgery on an artery in the head, neck, heart or legs?  2. NO - Do you ever have any pain or discomfort in your chest?  3. NO - Do you have a history of  Heart Failure?  4. NO - Are you troubled by shortness of breath when: walking on the level, up a slight hill or at night?  5. NO - Do you currently have a cold, bronchitis or other respiratory infection?  6. NO - Do you have a cough, shortness of breath or wheezing?  7. NO - Do you sometimes get pains in the calves of your legs when you walk?  8. NO - Do you or anyone in your family have previous history of blood clots?  9. NO - Do you or does anyone in your family have a serious bleeding problem such as prolonged bleeding following surgeries or cuts?  10. NO - Have you ever had problems with anemia or been told to take iron pills?  11. NO - Have you had any abnormal blood loss such as black, tarry or bloody stools, or abnormal vaginal bleeding?  12. NO - Have you ever had a blood transfusion?  13. NO - Have you or any of your relatives ever had problems with anesthesia?  14. NO - Do you have sleep apnea, excessive snoring or daytime drowsiness?  15. NO - Do  you have any prosthetic heart valves?  16. NO - Do you have prosthetic joints?  17. NO - Is there any chance that you may be pregnant?      HPI:                                                      Brief HPI related to upcoming procedure:     Left inguinal Hernia x 5 months, getting more painful overall.  No fevers. Worse after a long work day and walking.   No constipation or bowel problems.  No fevers.  No vomiting.           See problem list for active medical problems.  Problems all longstanding and stable, except as noted/documented.  See ROS for pertinent symptoms related to these conditions.                                                                                                  .    MEDICAL HISTORY:                                                      Patient Active Problem List    Diagnosis Date Noted     Left inguinal hernia 03/28/2017     Priority: Medium      Past Medical History:   Diagnosis Date     NO ACTIVE PROBLEMS      Past Surgical History:   Procedure Laterality Date     APPENDECTOMY       ORTHOPEDIC SURGERY      R shoulder surgery     ORTHOPEDIC SURGERY      R wrist surgery     ORTHOPEDIC SURGERY      Both knee surgery     No current outpatient prescriptions on file.     OTC products: None, except as noted above    No Known Allergies   Latex Allergy: NO    Social History   Substance Use Topics     Smoking status: Never Smoker     Smokeless tobacco: Not on file     Alcohol use No     History   Drug Use No       REVIEW OF SYSTEMS:                                                    Constitutional, neuro, ENT, endocrine, pulmonary, cardiac, gastrointestinal, genitourinary, musculoskeletal, integument and psychiatric systems are negative, except as otherwise noted.    EXAM:                                                    /71  Pulse 61  Temp 97.5  F (36.4  C) (Oral)  Wt 179 lb (81.2 kg)  SpO2 100%  BMI 25.68 kg/m2    GENERAL APPEARANCE: healthy, alert and no distress     EYES:  EOMI, PERRL     HENT: ear canals and TM's normal and nose and mouth without ulcers or lesions     NECK: no adenopathy, no asymmetry, masses, or scars and thyroid normal to palpation     RESP: lungs clear to auscultation - no rales, rhonchi or wheezes     CV: regular rates and rhythm, normal S1 S2, no S3 or S4 and no murmur, click or rub     ABDOMEN: soft, nontender     MS: extremities normal- no gross deformities noted, no evidence of inflammation in joints, FROM in all extremities.     SKIN: no suspicious lesions or rashes     NEURO: Normal strength and tone, sensory exam grossly normal, mentation intact and speech normal     PSYCH: mentation appears normal. and affect normal/bright    DIAGNOSTICS:                                                    No labs or EKG required for low risk surgery (cataract, skin procedure, breast biopsy, etc)  Hemoglobin (indicated for history of anemia or procedure with significant blood loss such as tonsillectomy, major intraperitoneal surgery, vascular surgery, major spine surgery, total joint replacement)    Results for orders placed or performed in visit on 04/06/17 (from the past 24 hour(s))   Hemoglobin   Result Value Ref Range    Hemoglobin 14.9 13.3 - 17.7 g/dL         IMPRESSION:                                                    Reason for surgery/procedure: left hernia repair  Diagnosis/reason for consult: left inguinal hernia/pain    The proposed surgical procedure is considered INTERMEDIATE risk.    REVISED CARDIAC RISK INDEX  The patient has the following serious cardiovascular risks for perioperative complications such as (MI, PE, VFib and 3  AV Block):  No serious cardiac risks  INTERPRETATION: 0 risks: Class I (very low risk - 0.4% complication rate)    The patient has the following additional risks for perioperative complications:  No identified additional risks          ICD-10-CM    1. Preop general physical exam Z01.818    2. Left inguinal hernia K40.90         RECOMMENDATIONS:                                                      Patient does want to make team aware that in the past (years ago) he became addicted to narcotics.  Would recommend weaning patient off narcotics as soon as possible after surgery while also giving adequate pain control.     --Patient is to take all scheduled medications on the day of surgery EXCEPT for modifications listed below.    APPROVAL GIVEN to proceed with proposed procedure, without further diagnostic evaluation        Signed Electronically by: Christine Yousif PA-C  Agreed with above, Vladimir Banks M.D.    Copy of this evaluation report is provided to requesting physician.    [facility] Preop Guidelines

## 2017-04-06 ENCOUNTER — OFFICE VISIT (OUTPATIENT)
Dept: FAMILY MEDICINE | Facility: CLINIC | Age: 32
End: 2017-04-06
Payer: COMMERCIAL

## 2017-04-06 VITALS
TEMPERATURE: 97.5 F | BODY MASS INDEX: 25.68 KG/M2 | OXYGEN SATURATION: 100 % | WEIGHT: 179 LBS | DIASTOLIC BLOOD PRESSURE: 71 MMHG | SYSTOLIC BLOOD PRESSURE: 112 MMHG | HEART RATE: 61 BPM

## 2017-04-06 DIAGNOSIS — Z01.818 PREOP GENERAL PHYSICAL EXAM: Primary | ICD-10-CM

## 2017-04-06 DIAGNOSIS — K40.90 LEFT INGUINAL HERNIA: ICD-10-CM

## 2017-04-06 LAB — HGB BLD-MCNC: 14.9 G/DL (ref 13.3–17.7)

## 2017-04-06 PROCEDURE — 85018 HEMOGLOBIN: CPT | Performed by: PHYSICIAN ASSISTANT

## 2017-04-06 PROCEDURE — 36415 COLL VENOUS BLD VENIPUNCTURE: CPT | Performed by: PHYSICIAN ASSISTANT

## 2017-04-06 PROCEDURE — 99214 OFFICE O/P EST MOD 30 MIN: CPT | Performed by: PHYSICIAN ASSISTANT

## 2017-04-06 NOTE — PROGRESS NOTES
Dear Raul,      It was a pleasure to see you at your recent office visit.  Your test results are listed below.  Hemoglobin normal, no anemia (red blood cell amount normal).          If you have any questions or concerns, please call the clinic at [phone].    Sincerely,  Christine Yousif PA-C

## 2017-04-06 NOTE — NURSING NOTE
"Chief Complaint   Patient presents with     Pre-Op Exam     Preop surgery on 04/19/2017 @ [facility]       Initial /71  Pulse 61  Temp 97.5  F (36.4  C) (Oral)  Wt 179 lb (81.2 kg)  SpO2 100%  BMI 25.68 kg/m2 Estimated body mass index is 25.68 kg/(m^2) as calculated from the following:    Height as of 3/28/17: 5' 10\" (1.778 m).    Weight as of this encounter: 179 lb (81.2 kg).  Medication Reconciliation: complete      Akanksha Vale MA    "

## 2017-04-06 NOTE — MR AVS SNAPSHOT
After Visit Summary   4/6/2017    Raul Aviles    MRN: [id number]           Patient Information     Date Of Birth          1985        Visit Information        Provider Department      4/6/2017 7:00 AM Christine Yousif PA-C [facility]        Today's Diagnoses     Preop general physical exam    -  1    Left inguinal hernia          Care Instructions      Before Your Surgery      Call your surgeon if there is any change in your health. This includes signs of a cold or flu (such as a sore throat, runny nose, cough, rash or fever).    Do not smoke, drink alcohol or take over the counter medicine (unless your surgeon or primary care doctor tells you to) for the 24 hours before and after surgery.    If you take prescribed drugs: Follow your doctor s orders about which medicines to take and which to stop until after surgery.    Eating and drinking prior to surgery: follow the instructions from your surgeon    Take a shower or bath the night before surgery. Use the soap your surgeon gave you to gently clean your skin. If you do not have soap from your surgeon, use your regular soap. Do not shave or scrub the surgery site.  Wear clean pajamas and have clean sheets on your bed.         Follow-ups after your visit        Your next 10 appointments already scheduled     Apr 19, 2017   [place] with [place] Baca [place]   [facility] [place]    [address]  [facility] [address]   [phone]            May 02, 2017  8:30 AM CDT   Post Op with Nelson Baca MD   [facility]    [address] [facility] [address] [phone]              Who to contact     If you have questions or need follow up information about today's clinic visit or your schedule please contact [facility] directly at [phone].  Normal or non-critical lab and imaging results will be communicated to you by Sil  letter or phone within 4 business days after the clinic has received the results. If you do not hear from us within 7 days, please contact the clinic through AdYapper or phone. If you have a critical or abnormal lab result, we will notify you by phone as soon as possible.  Submit refill requests through [employer]Yapper or call your pharmacy and they will forward the refill request to us. Please allow 3 business days for your refill to be completed.          Additional Information About Your Visit        Care EveryWhere ID     This is your Care EveryWhere ID. This could be used by other organizations to access your [place] medical records  [id number]        Your Vitals Were     Pulse Temperature Pulse Oximetry BMI (Body Mass Index)          61 97.5  F (36.4  C) (Oral) 100% 25.68 kg/m2         Blood Pressure from Last 3 Encounters:   04/06/17 112/71   03/28/17 121/57   03/18/17 113/70    Weight from Last 3 Encounters:   04/06/17 179 lb (81.2 kg)   03/28/17 179 lb (81.2 kg)   03/18/17 172 lb 8 oz (78.2 kg)              We Performed the Following     Hemoglobin        Primary Care Provider Office Phone # Fax #    [facility] [phone] [address] [facility] [address]        Thank you!     Thank you for choosing [facility]  for your care. Our goal is always to provide you with excellent care. Hearing back from our patients is one way we can continue to improve our services. Please take a few minutes to complete the written survey that you may receive in the mail after your visit with us. Thank you!             Your Updated Medication List - Protect others around you: Learn how to safely use, store and throw away your medicines at www.disposemymeds.org.      Notice  As of 4/6/2017  7:20 AM    You have not been prescribed any medications.

## 2017-04-06 NOTE — LETTER
[facility]  [address] [facility] [address] [phone]        April 6, 2017    Raul Aviles  [address]  [facility] [address]            Dear Raul,    It was a pleasure to see you at your recent office visit.  Your test results are listed below.  Hemoglobin normal, no anemia (red blood cell amount normal).          If you have any questions or concerns, please call the clinic at [phone].    Sincerely,  Christine Yousif PA-C    Results for orders placed or performed in visit on 04/06/17   Hemoglobin   Result Value Ref Range    Hemoglobin 14.9 13.3 - 17.7 g/dL

## 2017-04-19 ENCOUNTER — HOSPITAL ENCOUNTER (OUTPATIENT)
Facility: AMBULATORY SURGERY CENTER | Age: 32
Discharge: HOME | End: 2017-04-19
Attending: SURGERY | Admitting: SURGERY
Payer: COMMERCIAL

## 2017-04-19 ENCOUNTER — ANESTHESIA (OUTPATIENT)
Dept: SURGERY | Facility: AMBULATORY SURGERY CENTER | Age: 32
End: 2017-04-19

## 2017-04-19 ENCOUNTER — ANESTHESIA EVENT (OUTPATIENT)
Dept: SURGERY | Facility: AMBULATORY SURGERY CENTER | Age: 32
End: 2017-04-19

## 2017-04-19 VITALS
OXYGEN SATURATION: 94 % | BODY MASS INDEX: 25.05 KG/M2 | TEMPERATURE: 96.9 F | DIASTOLIC BLOOD PRESSURE: 79 MMHG | WEIGHT: 175 LBS | RESPIRATION RATE: 16 BRPM | SYSTOLIC BLOOD PRESSURE: 133 MMHG | HEIGHT: 70 IN

## 2017-04-19 DIAGNOSIS — K40.90 LEFT INGUINAL HERNIA: Primary | ICD-10-CM

## 2017-04-19 PROCEDURE — 49505 PRP I/HERN INIT REDUC >5 YR: CPT | Mod: LT | Performed by: SURGERY

## 2017-04-19 PROCEDURE — G8907 PT DOC NO EVENTS ON DISCHARG: HCPCS

## 2017-04-19 PROCEDURE — G8918 PT W/O PREOP ORDER IV AB PRO: HCPCS

## 2017-04-19 PROCEDURE — 49505 PRP I/HERN INIT REDUC >5 YR: CPT | Mod: LT

## 2017-04-19 DEVICE — IMPLANTABLE DEVICE: Type: IMPLANTABLE DEVICE | Site: GROIN | Status: FUNCTIONAL

## 2017-04-19 RX ORDER — ACETAMINOPHEN 325 MG/1
975 TABLET ORAL ONCE
Status: COMPLETED | OUTPATIENT
Start: 2017-04-19 | End: 2017-04-19

## 2017-04-19 RX ORDER — DEXAMETHASONE SODIUM PHOSPHATE 4 MG/ML
INJECTION, SOLUTION INTRA-ARTICULAR; INTRALESIONAL; INTRAMUSCULAR; INTRAVENOUS; SOFT TISSUE PRN
Status: DISCONTINUED | OUTPATIENT
Start: 2017-04-19 | End: 2017-04-19

## 2017-04-19 RX ORDER — KETOROLAC TROMETHAMINE 30 MG/ML
30 INJECTION, SOLUTION INTRAMUSCULAR; INTRAVENOUS EVERY 6 HOURS PRN
Status: DISCONTINUED | OUTPATIENT
Start: 2017-04-19 | End: 2017-04-20 | Stop reason: HOSPADM

## 2017-04-19 RX ORDER — DEXAMETHASONE SODIUM PHOSPHATE 4 MG/ML
4 INJECTION, SOLUTION INTRA-ARTICULAR; INTRALESIONAL; INTRAMUSCULAR; INTRAVENOUS; SOFT TISSUE EVERY 10 MIN PRN
Status: DISCONTINUED | OUTPATIENT
Start: 2017-04-19 | End: 2017-04-20 | Stop reason: HOSPADM

## 2017-04-19 RX ORDER — GABAPENTIN 300 MG/1
300 CAPSULE ORAL ONCE
Status: COMPLETED | OUTPATIENT
Start: 2017-04-19 | End: 2017-04-19

## 2017-04-19 RX ORDER — PROPOFOL 10 MG/ML
INJECTION, EMULSION INTRAVENOUS PRN
Status: DISCONTINUED | OUTPATIENT
Start: 2017-04-19 | End: 2017-04-19

## 2017-04-19 RX ORDER — OXYCODONE AND ACETAMINOPHEN 5; 325 MG/1; MG/1
1-2 TABLET ORAL EVERY 6 HOURS PRN
Qty: 40 TABLET | Refills: 0 | Status: SHIPPED | OUTPATIENT
Start: 2017-04-19 | End: 2018-01-04

## 2017-04-19 RX ORDER — FENTANYL CITRATE 50 UG/ML
25-50 INJECTION, SOLUTION INTRAMUSCULAR; INTRAVENOUS
Status: DISCONTINUED | OUTPATIENT
Start: 2017-04-19 | End: 2017-04-20 | Stop reason: HOSPADM

## 2017-04-19 RX ORDER — EPHEDRINE SULFATE 50 MG/ML
INJECTION, SOLUTION INTRAMUSCULAR; INTRAVENOUS; SUBCUTANEOUS PRN
Status: DISCONTINUED | OUTPATIENT
Start: 2017-04-19 | End: 2017-04-19

## 2017-04-19 RX ORDER — LIDOCAINE HYDROCHLORIDE 20 MG/ML
INJECTION, SOLUTION INFILTRATION; PERINEURAL PRN
Status: DISCONTINUED | OUTPATIENT
Start: 2017-04-19 | End: 2017-04-19

## 2017-04-19 RX ORDER — ONDANSETRON 2 MG/ML
4 INJECTION INTRAMUSCULAR; INTRAVENOUS EVERY 30 MIN PRN
Status: DISCONTINUED | OUTPATIENT
Start: 2017-04-19 | End: 2017-04-20 | Stop reason: HOSPADM

## 2017-04-19 RX ORDER — ONDANSETRON 4 MG/1
4 TABLET, ORALLY DISINTEGRATING ORAL EVERY 30 MIN PRN
Status: DISCONTINUED | OUTPATIENT
Start: 2017-04-19 | End: 2017-04-20 | Stop reason: HOSPADM

## 2017-04-19 RX ORDER — SODIUM CHLORIDE, SODIUM LACTATE, POTASSIUM CHLORIDE, CALCIUM CHLORIDE 600; 310; 30; 20 MG/100ML; MG/100ML; MG/100ML; MG/100ML
INJECTION, SOLUTION INTRAVENOUS CONTINUOUS
Status: DISCONTINUED | OUTPATIENT
Start: 2017-04-19 | End: 2017-04-20 | Stop reason: HOSPADM

## 2017-04-19 RX ORDER — KETOROLAC TROMETHAMINE 30 MG/ML
INJECTION, SOLUTION INTRAMUSCULAR; INTRAVENOUS PRN
Status: DISCONTINUED | OUTPATIENT
Start: 2017-04-19 | End: 2017-04-19

## 2017-04-19 RX ORDER — NALOXONE HYDROCHLORIDE 0.4 MG/ML
.1-.4 INJECTION, SOLUTION INTRAMUSCULAR; INTRAVENOUS; SUBCUTANEOUS
Status: DISCONTINUED | OUTPATIENT
Start: 2017-04-19 | End: 2017-04-20 | Stop reason: HOSPADM

## 2017-04-19 RX ORDER — BUPIVACAINE HYDROCHLORIDE AND EPINEPHRINE 2.5; 5 MG/ML; UG/ML
INJECTION, SOLUTION INFILTRATION; PERINEURAL PRN
Status: DISCONTINUED | OUTPATIENT
Start: 2017-04-19 | End: 2017-04-19 | Stop reason: HOSPADM

## 2017-04-19 RX ORDER — CEFAZOLIN SODIUM 2 G/100ML
2 INJECTION, SOLUTION INTRAVENOUS
Status: COMPLETED | OUTPATIENT
Start: 2017-04-19 | End: 2017-04-19

## 2017-04-19 RX ORDER — MEPERIDINE HYDROCHLORIDE 25 MG/ML
12.5 INJECTION INTRAMUSCULAR; INTRAVENOUS; SUBCUTANEOUS
Status: DISCONTINUED | OUTPATIENT
Start: 2017-04-19 | End: 2017-04-20 | Stop reason: HOSPADM

## 2017-04-19 RX ORDER — LIDOCAINE 40 MG/G
CREAM TOPICAL
Status: DISCONTINUED | OUTPATIENT
Start: 2017-04-19 | End: 2017-04-20 | Stop reason: HOSPADM

## 2017-04-19 RX ORDER — ALBUTEROL SULFATE 0.83 MG/ML
2.5 SOLUTION RESPIRATORY (INHALATION) EVERY 4 HOURS PRN
Status: DISCONTINUED | OUTPATIENT
Start: 2017-04-19 | End: 2017-04-20 | Stop reason: HOSPADM

## 2017-04-19 RX ORDER — OXYCODONE HYDROCHLORIDE 5 MG/1
5-10 TABLET ORAL EVERY 4 HOURS PRN
Status: DISCONTINUED | OUTPATIENT
Start: 2017-04-19 | End: 2017-04-20 | Stop reason: HOSPADM

## 2017-04-19 RX ORDER — HYDROMORPHONE HYDROCHLORIDE 1 MG/ML
.3-.5 INJECTION, SOLUTION INTRAMUSCULAR; INTRAVENOUS; SUBCUTANEOUS EVERY 10 MIN PRN
Status: DISCONTINUED | OUTPATIENT
Start: 2017-04-19 | End: 2017-04-20 | Stop reason: HOSPADM

## 2017-04-19 RX ORDER — FENTANYL CITRATE 50 UG/ML
INJECTION, SOLUTION INTRAMUSCULAR; INTRAVENOUS PRN
Status: DISCONTINUED | OUTPATIENT
Start: 2017-04-19 | End: 2017-04-19

## 2017-04-19 RX ORDER — ONDANSETRON 2 MG/ML
INJECTION INTRAMUSCULAR; INTRAVENOUS PRN
Status: DISCONTINUED | OUTPATIENT
Start: 2017-04-19 | End: 2017-04-19

## 2017-04-19 RX ADMIN — CEFAZOLIN SODIUM 2 G: 2 INJECTION, SOLUTION INTRAVENOUS at 11:38

## 2017-04-19 RX ADMIN — HYDROMORPHONE HYDROCHLORIDE 0.5 MG: 1 INJECTION, SOLUTION INTRAMUSCULAR; INTRAVENOUS; SUBCUTANEOUS at 13:18

## 2017-04-19 RX ADMIN — DEXAMETHASONE SODIUM PHOSPHATE 4 MG: 4 INJECTION, SOLUTION INTRA-ARTICULAR; INTRALESIONAL; INTRAMUSCULAR; INTRAVENOUS; SOFT TISSUE at 11:37

## 2017-04-19 RX ADMIN — FENTANYL CITRATE 50 MCG: 50 INJECTION, SOLUTION INTRAMUSCULAR; INTRAVENOUS at 11:32

## 2017-04-19 RX ADMIN — EPHEDRINE SULFATE 5 MG: 50 INJECTION, SOLUTION INTRAMUSCULAR; INTRAVENOUS; SUBCUTANEOUS at 12:11

## 2017-04-19 RX ADMIN — FENTANYL CITRATE 50 MCG: 50 INJECTION, SOLUTION INTRAMUSCULAR; INTRAVENOUS at 12:56

## 2017-04-19 RX ADMIN — GABAPENTIN 300 MG: 300 CAPSULE ORAL at 10:13

## 2017-04-19 RX ADMIN — ONDANSETRON 4 MG: 2 INJECTION INTRAMUSCULAR; INTRAVENOUS at 11:37

## 2017-04-19 RX ADMIN — LIDOCAINE HYDROCHLORIDE 40 MG: 20 INJECTION, SOLUTION INFILTRATION; PERINEURAL at 11:34

## 2017-04-19 RX ADMIN — SODIUM CHLORIDE, SODIUM LACTATE, POTASSIUM CHLORIDE, CALCIUM CHLORIDE: 600; 310; 30; 20 INJECTION, SOLUTION INTRAVENOUS at 10:24

## 2017-04-19 RX ADMIN — FENTANYL CITRATE 50 MCG: 50 INJECTION, SOLUTION INTRAMUSCULAR; INTRAVENOUS at 13:00

## 2017-04-19 RX ADMIN — KETOROLAC TROMETHAMINE 30 MG: 30 INJECTION, SOLUTION INTRAMUSCULAR; INTRAVENOUS at 12:41

## 2017-04-19 RX ADMIN — ACETAMINOPHEN 975 MG: 325 TABLET ORAL at 10:13

## 2017-04-19 RX ADMIN — EPHEDRINE SULFATE 5 MG: 50 INJECTION, SOLUTION INTRAMUSCULAR; INTRAVENOUS; SUBCUTANEOUS at 12:07

## 2017-04-19 RX ADMIN — HYDROMORPHONE HYDROCHLORIDE 0.5 MG: 1 INJECTION, SOLUTION INTRAMUSCULAR; INTRAVENOUS; SUBCUTANEOUS at 13:07

## 2017-04-19 RX ADMIN — PROPOFOL 210 MG: 10 INJECTION, EMULSION INTRAVENOUS at 11:34

## 2017-04-19 RX ADMIN — FENTANYL CITRATE 50 MCG: 50 INJECTION, SOLUTION INTRAMUSCULAR; INTRAVENOUS at 11:41

## 2017-04-19 NOTE — PROGRESS NOTES
No changes from previous exam  Procedure explained.  Questions answered  Plan left inguinal hernia repair with mesh open  Nelson Baca MD

## 2017-04-19 NOTE — ANESTHESIA CARE TRANSFER NOTE
Patient: Raul Aviles    Procedure(s):  Open left inguinal hernia repair - Wound Class: I-Clean    Diagnosis: left inguinal hernia  Diagnosis Additional Information: No value filed.    Anesthesia Type:   General, LMA     Note:  Airway :Nasal Cannula  Patient transferred to:PACU  Comments: To PACU, awake, comfortable, sats 100%, NC, Report to RN.      Vitals: (Last set prior to Anesthesia Care Transfer)    CRNA VITALS  4/19/2017 1216 - 4/19/2017 1250      4/19/2017             Pulse: 96    SpO2: 98 %                Electronically Signed By: JANESSA Alex CRNA  April 19, 2017  12:50 PM

## 2017-04-19 NOTE — ANESTHESIA POSTPROCEDURE EVALUATION
Patient: Raul Aviles    Procedure(s):  Open left inguinal hernia repair - Wound Class: I-Clean    Diagnosis:left inguinal hernia  Diagnosis Additional Information: No value filed.    Anesthesia Type:  General, LMA    Note:  Anesthesia Post Evaluation    Patient location during evaluation: PACU  Patient participation: Able to fully participate in evaluation  Level of consciousness: awake  Pain management: adequate  Airway patency: patent  Cardiovascular status: acceptable  Respiratory status: acceptable  Hydration status: balanced  PONV: none     Anesthetic complications: None          Last vitals:  Vitals:    04/19/17 1320 04/19/17 1325 04/19/17 1330   BP:   133/79   Resp:  14 16   Temp:      SpO2: 99% 96% 94%         Electronically Signed By: Guille Mendez MD  April 19, 2017  3:31 PM

## 2017-04-19 NOTE — OP NOTE
POST OPERATIVE NOTE-IMMEDIATE :  Date of Service: 4/19/2017    Preoperative Diagnosis:  left inguinal hernia    Postoperative Diagnosis:lih with indirect hernia  * No post-op diagnosis entered *    Procedures:  Left inguinal hernia repair with extra large mesh plug and 8 by 12 cm progrip    Prosthetic Devices: See Nurses note.    Surgeon(s) and Assistants (if any):    Surgeon(s):  Nelson Baca MD  Circulator: Deanna Singh RN; Susi Infante RN  Relief Circulator: Giovanna Savage RN  Scrub Person: Trina Potts    Anesthesia:  General    Drains: None    Specimens: none      Tissue Removed, Not Sent:  No    Complications: none    Findings/Conclusions:  As above    Estimated Blood Loss:  Less than 20 mL.    Condition on discharge from OR:  Satisfactory    [id number]  Nelson Baca MD

## 2017-04-19 NOTE — IP AVS SNAPSHOT
[facility] [place]    [address] [place] [address]    Phone:  [phone]                                       After Visit Summary   4/19/2017    Raul Aviles    MRN: [id number]           After Visit Summary Signature Page     I have received my discharge instructions, and my questions have been answered. I have discussed any challenges I see with this plan with the nurse or doctor.    ..........................................................................................................................................  Patient/Patient Representative Signature      ..........................................................................................................................................  Patient Representative Print Name and Relationship to Patient    ..................................................               ................................................  Date                                            Time    ..........................................................................................................................................  Reviewed by Signature/Title    ...................................................              ..............................................  Date                                                            Time

## 2017-04-19 NOTE — ANESTHESIA PREPROCEDURE EVALUATION
Anesthesia Evaluation     . Pt has had prior anesthetic.     No history of anesthetic complications          ROS/MED HX    ENT/Pulmonary:  - neg pulmonary ROS     Neurologic:  - neg neurologic ROS     Cardiovascular:  - neg cardiovascular ROS       METS/Exercise Tolerance:     Hematologic:  - neg hematologic  ROS       Musculoskeletal:  - neg musculoskeletal ROS       GI/Hepatic:  - neg GI/hepatic ROS       Renal/Genitourinary:  - ROS Renal section negative       Endo:  - neg endo ROS       Psychiatric:  - neg psychiatric ROS       Infectious Disease:  - neg infectious disease ROS       Malignancy:      - no malignancy   Other:    - neg other ROS                 Physical Exam  Normal systems: cardiovascular, pulmonary and dental    Airway   Mallampati: I  TM distance: >3 FB  Neck ROM: full    Dental     Cardiovascular       Pulmonary                     Anesthesia Plan      History & Physical Review  History and physical reviewed and following examination; no interval change.    ASA Status:  1 .    NPO Status:  > 8 hours    Plan for General and LMA with Intravenous induction. Maintenance will be Balanced.    PONV prophylaxis:  Ondansetron (or other 5HT-3) and Dexamethasone or Solumedrol       Postoperative Care  Postoperative pain management:  IV analgesics and Oral pain medications.      Consents  Anesthetic plan, risks, benefits and alternatives discussed with:  Patient..                          .

## 2017-04-19 NOTE — DISCHARGE INSTRUCTIONS
HERNIORRAPHY DISCHARGE INSTRUCTIONS  DR. ENRRIQUE BOSTON  1. You may resume your regular diet when you feel you are ready to. DO NOT drink alcoholic beverages for  24 hours of while you are taking prescription medication.  2. Limit your activities for the first 48 hours. Gradually, increase them as tolerated. You may use stairs.  I encourage you to walk as tolerated. No lifting greater that 20 pounds for _3___ weeks.  3. You will have some discomfort at the incision sites. This is expected. This should improve over the next  2-3 days. Ice and pain medication will help with this pain. Use prescribed pain medication as instructed.  4. Bruising and mild swelling is normal after surgery. For males it is common to have bruising going into the  penis and scrotum. The area below and around the incision(s) will be hard and elevated. This is normal. I call  it the healing ridge. This will resolve slowly over the next several months. If you feel the pain is increasing  and cannot explain it by increasing activity please call us at [phone]  5. The dressing will often have some blood on it. You may shower 24 hours after surgery. Clean gently over  incision site. If clear plastic covering or steri-strip comes off and there is still some bleeding or drainage  then cover with gauze or band-aid. If no bleeding there is no reason to cover site. The abdominal binder  may be removed after 24 hours after surgery. You may continue to wear it however for comfort. I suggest  you wear an old [employer] shirt under the abdominal binder for a more comfortable wear.  6. Avoid Aspirin for the first 72 hours after the procedure. This medication may increase the tendency to bleed.  7. Use the following medications (in addition to your normal meds) as shown:  Name of Medicine Dose Frequency Reason  a. Percocet 5 mg 1-2 every 6 hours as needed for severe pain. This contains 325 mg of Tylenol (acetaminophen)  per tablet. For example, you may take 1  Percocet and 1 Tylenol, or 2 Percocet and no Tylenol,  or 2 Tylenol and no Percocet every 6 hours.  b. Tylenol (acetaminophen) 500 mg every 6 hours as needed for mild pain. Do not take more than 1000 mg  every 6 hours. (see above)  c. Motrin (ibuprophen) 200-800 mg every 6 hours as needed for mild to moderate pain. Take with food.  d. _________________________________________________________________________  8. [facility] [phone] if:    Your discomfort is not relieved by your pain medication    You have signs of infection such as temperature above 100.5 degrees orally, chills, or  increasing daily discomfort.    Incision site is becoming more red and/or there is purulent drainage.    You have questions or concerns  9. Please call [phone] to schedule a follow up appointment in about 2 weeks(s)  10. When taking narcotics (pain medication more than Tylenol (acetaminophen) and Motrin (ibuprophen) it is  important to keep your stools soft to avoid constipation and pain with straining. This is best done by drinking  fluids (nonalcoholic and non-caffeinated) and taking a stool softener i.e. Metamucil or milk of magnesia.  You may be able to use non-narcotics for pain relief especially by the 3rd post- operative day. Yayieyl254 mg  every 6 hours and/or Motrin (ibuprophen) 200-800 mg every 6 hours. Please do not take more than 4 grams  of Tylenol (acetaminophen) per day. Remember your Percocet does have Tylenol (acetaminophen) already  in it. If you have a history of stomach ulcers or stomach problems than do not take the Motrin (ibuprophen).  Please take Motrin (ibuprophen) with food to help protect the stomach.  11. Do not drive or operate heavy machinery for 24 hours after surgery or when taking narcotics. You may  resume driving when feel that you can safely avoid an accident and are not taking narcotics. This is usually  5 to 7 days after surgery. You should not be alone for 24 hours  after surgery.    Have milk of magnesia available at home so that when you take the pain medications you take 1-2 teaspoons a day,  to help reduce problems with constipation.      [facility]  Same-Day Surgery   Adult Discharge Orders & Instructions   For 24 hours after surgery  1. Get plenty of rest.  A responsible adult must stay with you for at least 24 hours after you leave the hospital.   2. Do not drive or use heavy equipment.  If you have weakness or tingling, don't drive or use heavy equipment until this feeling goes away.  3. Do not drink alcohol.  4. Avoid strenuous or risky activities.  Ask for help when climbing stairs.   5. You may feel lightheaded.  IF so, sit for a few minutes before standing.  Have someone help you get up.   6. If you have nausea (feel sick to your stomach): Drink only clear liquids such as apple juice, ginger ale, broth or 7-Up.  Rest may also help.  Be sure to drink enough fluids.  Move to a regular diet as you feel able.  7. You may have a slight fever. Call the doctor if your fever is over 100 F (37.7 C) (taken under the tongue) or lasts longer than 24 hours.  8. You may have a dry mouth, a sore throat, muscle aches or trouble sleeping.  These should go away after 24 hours.  9. Do not make important or legal decisions.   Call your doctor for any of the followin.  Signs of infection (fever, growing tenderness at the surgery site, a large amount of drainage or bleeding, severe pain, foul-smelling drainage, redness, swelling).    2. It has been over 8 to 10 hours since surgery and you are still not able to urinate (pass water).    3.  Headache for over 24 hours.    4.  Numbness, tingling or weakness the day after surgery (if you had spinal anesthesia).  To contact a doctor, call _____[phone]______________________

## 2017-04-19 NOTE — IP AVS SNAPSHOT
MRN:[id number]                      After Visit Summary   4/19/2017    Raul Aviles    MRN: [id number]           Thank you!     Thank you for choosing [facility] for your care. Our goal is always to provide you with excellent care. Hearing back from our patients is one way we can continue to improve our services. Please take a few minutes to complete the written survey that you may receive in the mail after you visit with us. Thank you!        Patient Information     Date Of Birth          1985        About your hospital stay     You were admitted on:  April 19, 2017 You last received care in the:  [facility]    You were discharged on:  April 19, 2017       Who to Call     For medical emergencies, please call 911.  For non-urgent questions about your medical care, please call your primary care provider or clinic, [phone]  For questions related to your surgery, please call your surgery clinic        Attending Provider     Provider Specialty    Nelson Baca MD Surgery       Primary Care Provider Office Phone # Fax #    [facility] [phone] [address] [facility] [address]        Your next 10 appointments already scheduled     May 02, 2017  8:30 AM CDT   Post Op with Nelson Baca MD   [facility]    [address] [facility] [address] [phone]              Further instructions from your care team       HERNIORRAPHY DISCHARGE INSTRUCTIONS  DR. NELSON BACA  1. You may resume your regular diet when you feel you are ready to. DO NOT drink alcoholic beverages for  24 hours of while you are taking prescription medication.  2. Limit your activities for the first 48 hours. Gradually, increase them as tolerated. You may use stairs.  I encourage you to walk as tolerated. No lifting greater that 20 pounds for _3___ weeks.  3. You will have some discomfort at the  incision sites. This is expected. This should improve over the next  2-3 days. Ice and pain medication will help with this pain. Use prescribed pain medication as instructed.  4. Bruising and mild swelling is normal after surgery. For males it is common to have bruising going into the  penis and scrotum. The area below and around the incision(s) will be hard and elevated. This is normal. I call  it the healing ridge. This will resolve slowly over the next several months. If you feel the pain is increasing  and cannot explain it by increasing activity please call us at [phone]  5. The dressing will often have some blood on it. You may shower 24 hours after surgery. Clean gently over  incision site. If clear plastic covering or steri-strip comes off and there is still some bleeding or drainage  then cover with gauze or band-aid. If no bleeding there is no reason to cover site. The abdominal binder  may be removed after 24 hours after surgery. You may continue to wear it however for comfort. I suggest  you wear an old [employer] shirt under the abdominal binder for a more comfortable wear.  6. Avoid Aspirin for the first 72 hours after the procedure. This medication may increase the tendency to bleed.  7. Use the following medications (in addition to your normal meds) as shown:  Name of Medicine Dose Frequency Reason  a. Percocet 5 mg 1-2 every 6 hours as needed for severe pain. This contains 325 mg of Tylenol (acetaminophen)  per tablet. For example, you may take 1 Percocet and 1 Tylenol, or 2 Percocet and no Tylenol,  or 2 Tylenol and no Percocet every 6 hours.  b. Tylenol (acetaminophen) 500 mg every 6 hours as needed for mild pain. Do not take more than 1000 mg  every 6 hours. (see above)  c. Motrin (ibuprophen) 200-800 mg every 6 hours as needed for mild to moderate pain. Take with food.  d. _________________________________________________________________________  8. [facility] at [phone] if:    Your discomfort is not relieved by your pain medication    You have signs of infection such as temperature above 100.5 degrees orally, chills, or  increasing daily discomfort.    Incision site is becoming more red and/or there is purulent drainage.    You have questions or concerns  9. Please call [phone] to schedule a follow up appointment in about 2 weeks(s)  10. When taking narcotics (pain medication more than Tylenol (acetaminophen) and Motrin (ibuprophen) it is  important to keep your stools soft to avoid constipation and pain with straining. This is best done by drinking  fluids (nonalcoholic and non-caffeinated) and taking a stool softener i.e. Metamucil or milk of magnesia.  You may be able to use non-narcotics for pain relief especially by the 3rd post- operative day. [id number] mg  every 6 hours and/or Motrin (ibuprophen) 200-800 mg every 6 hours. Please do not take more than 4 grams  of Tylenol (acetaminophen) per day. Remember your Percocet does have Tylenol (acetaminophen) already  in it. If you have a history of stomach ulcers or stomach problems than do not take the Motrin (ibuprophen).  Please take Motrin (ibuprophen) with food to help protect the stomach.  11. Do not drive or operate heavy machinery for 24 hours after surgery or when taking narcotics. You may  resume driving when feel that you can safely avoid an accident and are not taking narcotics. This is usually  5 to 7 days after surgery. You should not be alone for 24 hours after surgery.    Have milk of magnesia available at home so that when you take the pain medications you take 1-2 teaspoons a day,  to help reduce problems with constipation.      [facility]  Same-Day Surgery   Adult Discharge Orders & Instructions   For 24 hours after surgery  1. Get plenty of rest.  A responsible adult must stay with you for at least 24 hours after you leave the hospital.   2. Do not drive or use heavy  "equipment.  If you have weakness or tingling, don't drive or use heavy equipment until this feeling goes away.  3. Do not drink alcohol.  4. Avoid strenuous or risky activities.  Ask for help when climbing stairs.   5. You may feel lightheaded.  IF so, sit for a few minutes before standing.  Have someone help you get up.   6. If you have nausea (feel sick to your stomach): Drink only clear liquids such as apple juice, ginger ale, broth or 7-Up.  Rest may also help.  Be sure to drink enough fluids.  Move to a regular diet as you feel able.  7. You may have a slight fever. Call the doctor if your fever is over 100 F (37.7 C) (taken under the tongue) or lasts longer than 24 hours.  8. You may have a dry mouth, a sore throat, muscle aches or trouble sleeping.  These should go away after 24 hours.  9. Do not make important or legal decisions.   Call your doctor for any of the followin.  Signs of infection (fever, growing tenderness at the surgery site, a large amount of drainage or bleeding, severe pain, foul-smelling drainage, redness, swelling).    2. It has been over 8 to 10 hours since surgery and you are still not able to urinate (pass water).    3.  Headache for over 24 hours.    4.  Numbness, tingling or weakness the day after surgery (if you had spinal anesthesia).  To contact a doctor, call _____[phone]______________________       Pending Results     No orders found from 2017 to 2017.            Admission Information     Date & Time Provider Department Dept. Phone    2017 Nelson Baca MD [facility] [phone]      Your Vitals Were     Blood Pressure Temperature Respirations Height Weight Pulse Oximetry    113/74 97.3  F (36.3  C) (Temporal) 18 1.778 m (5' 10\") 79.4 kg (175 lb) 99%    BMI (Body Mass Index)                   25.11 kg/m2           Care EveryWhere ID     This is your Care EveryWhere ID. This could be used by other organizations to access your " [facility] medical records  [id number]           Review of your medicines      START taking        Dose / Directions    oxyCODONE-acetaminophen 5-325 MG per tablet   Commonly known as:  PERCOCET   Used for:  Left inguinal hernia        Dose:  1-2 tablet   Take 1-2 tablets by mouth every 6 hours as needed   Quantity:  40 tablet   Refills:  0            Where to get your medicines      Some of these will need a paper prescription and others can be bought over the counter. Ask your nurse if you have questions.     Bring a paper prescription for each of these medications     oxyCODONE-acetaminophen 5-325 MG per tablet                Protect others around you: Learn how to safely use, store and throw away your medicines at www.disposemymeds.org.             Medication List: This is a list of all your medications and when to take them. Check marks below indicate your daily home schedule. Keep this list as a reference.      Medications           Morning Afternoon Evening Bedtime As Needed    oxyCODONE-acetaminophen 5-325 MG per tablet   Commonly known as:  PERCOCET   Take 1-2 tablets by mouth every 6 hours as needed

## 2017-04-20 NOTE — OP NOTE
DATE OF PROCEDURE:  04/19/2017      PREOPERATIVE DIAGNOSIS:  Left inguinal hernia.      POSTOPERATIVE DIAGNOSIS:  Left inguinal hernia with a large indirect hernia and some weakness of the floor in that area.      PROCEDURE:  Left inguinal hernia repair with an extra large mesh plug and 8 x 12 cm ProGrip mesh.      ANESTHESIA:  General anesthetic with 0.25% Marcaine with epinephrine injected into the sites prior to and at the end of procedure.      ESTIMATED BLOOD LOSS:  Less than 6 mL.      INDICATIONS:  Raul Aviles is a 31-year-old male who has an obvious left inguinal hernia that has been bothering him more and more he would like to have this repaired.  We discussed risks and complications including bleeding, infection, damage to bowel, recurrence, though mesh is a long-term repair, if it gets infected it has to be removed, damage to nerves, damage to nerves in the hernia sac, damage to vessels, testicle, the risks of anesthesia.  He understands and would like to proceed.      DESCRIPTION OF PROCEDURE:  The patient was brought to the OR, placed in supine position.  After receiving general anesthesia, the left groin was prepped and draped in sterile manner.  Incision was made in the usual fashion with a knife over the hernia site with a knife and then cautery and blunt dissection used to dissect down to the external oblique.  The external oblique was opened in direction of its fibers staying fairly medial at the internal ring using cautery.  After that was done, the spermatic cord was carefully freed from surrounding attachments and a Penrose drain was placed around it.  There was some weakness around the spermatic cord, but this was not the whole floor.  We did find the hernia sac within the spermatic cord and this was freed up with blunt, sharp and cautery dissection then freed up all the way down until it went down back into the abdomen without any difficulty and with nothing attaching to it to keep it coming  back up.  After that was done, there was still a defect and I felt that it might be better to place an extra large mesh plug in this area, so I did and then I secured that in multiple spots with 0 Prolene suture, taking great care not to involve the ilioinguinal nerve or the vessels or the vas to the spermatic cord and any vessels.  After that was done, I did actually close some of the internal oblique muscle and fascia over this site and brought up with a continuous running 0 Vicryl suture close to the ilioinguinal ligament to help keep that area covered, so I would have a good base for placement of ProGrip.  Then the ProGrip was placed so that it was deep to the spermatic cord with the blue line over the pubic tubercle and then laid it down very nicely all the way up to the anterior superior iliac spine laterally and on the conjoined tendon medially after dissecting this out.  The ilioinguinal nerve actually ran underneath it, so we left it in position.  After that was placed, I did tighten up the internal ring by wrapping the mesh a little bit closer.  I was able to place a finger between the mesh and the spermatic cord without difficulty, going all the way through down to the mesh plug.  After that was done the nerve appeared to be intact.  The external oblique was closed with a continuous running 0 Vicryl suture, taking great care not to involve any nerves or the spermatic cord.  Then, Kristy's fascia was brought together with multiple interrupted 3-0 Vicryl sutures, and the skin was closed using 4-0 Monocryl, covered with tincture of benzoin, Steri-Strips and a Tegaderm.  All areas were irrigated with antibiotic irrigation before going to the next layer.  Good hemostasis was achieved throughout the procedure.  All areas were infiltrated with Marcaine before going to the next layer.  The patient tolerated the procedure very well.  Plan is to discharge him home today.  No lifting more than 20 pounds for 3 weeks.   We will see him back in approximately 2 weeks.  He did receive antibiotics preoperatively.         ENRRIQUE BOSTON MD             D: 2017 13:05   T: 2017 21:53   MT: [id number]      Name:     MARIA TERESA PEÑA   MRN:      [id number]        Account:        [id number]   :      1985           Procedure Date: 2017      Document: [id number]       cc: Christine Yousif PA-C

## 2017-05-02 ENCOUNTER — OFFICE VISIT (OUTPATIENT)
Dept: SURGERY | Facility: CLINIC | Age: 32
End: 2017-05-02
Payer: COMMERCIAL

## 2017-05-02 VITALS
HEIGHT: 70 IN | DIASTOLIC BLOOD PRESSURE: 80 MMHG | HEART RATE: 70 BPM | SYSTOLIC BLOOD PRESSURE: 133 MMHG | WEIGHT: 175 LBS | BODY MASS INDEX: 25.05 KG/M2

## 2017-05-02 DIAGNOSIS — Z87.19 S/P LEFT INGUINAL HERNIORRHAPHY: Primary | ICD-10-CM

## 2017-05-02 DIAGNOSIS — Z98.890 S/P LEFT INGUINAL HERNIORRHAPHY: Primary | ICD-10-CM

## 2017-05-02 PROCEDURE — 99024 POSTOP FOLLOW-UP VISIT: CPT | Performed by: SURGERY

## 2017-05-02 ASSESSMENT — PAIN SCALES - GENERAL: PAINLEVEL: MILD PAIN (2)

## 2017-05-02 NOTE — NURSING NOTE
"Chief Complaint   Patient presents with     Surgical Followup     [facility]       Initial /80  Pulse 70  Ht 5' 10\" (1.778 m)  Wt 175 lb (79.4 kg)  BMI 25.11 kg/m2 Estimated body mass index is 25.11 kg/(m^2) as calculated from the following:    Height as of this encounter: 5' 10\" (1.778 m).    Weight as of this encounter: 175 lb (79.4 kg).  Medication Reconciliation: complete   Chery Kaur Cma      "

## 2017-05-02 NOTE — PROGRESS NOTES
Raul is a 31 year old male who is status post left inguinal hernia repair with mesh plug and mesh overlay with no chills and nofever.      Patient's  Pain is  improving.  Appetite is  Improving.  Surgery in April 19 and starting to get electric shocks and now is still getting them and feels some burning.  More with sitting and first stands up.  Motrin is helping.  Stopped icing a few days ago.   Has some tenderness above and to the left of the incision  And patient feels like it feels hard there.  The burning and shock sensation only lasts seconds.      Wound(s)  Is/are   clean dry and intact with no evidence of infection.    Questions were answered and discussion of no lifting more than 20 pounds for 6 weeks after surgery.  Patient is a .      Patient fracture the sacrum and had had shooting nerve pain like he has now.  That lasted for 3 months.      Plan: follow up as needed.  Let us know or follow up with me if you are still having the pain.  We can talk about medications that may help and about a pain clinic but this will hopefully resolve on its own with time.    Please call Chery's number at  if getting worse.      Use antibiotic ointment on wound at night.     Time spent with the patient with greater that 50% of the time in discussion was 20 minutes.  Nelson Baca MD        DATE OF PROCEDURE: 04/19/2017       PREOPERATIVE DIAGNOSIS: Left inguinal hernia.       POSTOPERATIVE DIAGNOSIS: Left inguinal hernia with a large indirect hernia and some weakness of the floor in that area.       PROCEDURE: Left inguinal hernia repair with an extra large mesh plug and 8 x 12 cm ProGrip mesh.       ANESTHESIA: General anesthetic with 0.25% Marcaine with epinephrine injected into the sites prior to and at the end of procedure.       ESTIMATED BLOOD LOSS: Less than 6 mL.       INDICATIONS: Raul Aviles is a 31-year-old male who has an obvious left inguinal hernia that has been bothering him more and  more he would like to have this repaired. We discussed risks and complications including bleeding, infection, damage to bowel, recurrence, though mesh is a long-term repair, if it gets infected it has to be removed, damage to nerves, damage to nerves in the hernia sac, damage to vessels, testicle, the risks of anesthesia. He understands and would like to proceed.       DESCRIPTION OF PROCEDURE: The patient was brought to the OR, placed in supine position. After receiving general anesthesia, the left groin was prepped and draped in sterile manner. Incision was made in the usual fashion with a knife over the hernia site with a knife and then cautery and blunt dissection used to dissect down to the external oblique. The external oblique was opened in direction of its fibers staying fairly medial at the internal ring using cautery. After that was done, the spermatic cord was carefully freed from surrounding attachments and a Penrose drain was placed around it. There was some weakness around the spermatic cord, but this was not the whole floor. We did find the hernia sac within the spermatic cord and this was freed up with blunt, sharp and cautery dissection then freed up all the way down until it went down back into the abdomen without any difficulty and with nothing attaching to it to keep it coming back up. After that was done, there was still a defect and I felt that it might be better to place an extra large mesh plug in this area, so I did and then I secured that in multiple spots with 0 Prolene suture, taking great care not to involve the ilioinguinal nerve or the vessels or the vas to the spermatic cord and any vessels. After that was done, I did actually close some of the internal oblique muscle and fascia over this site and brought up with a continuous running 0 Vicryl suture close to the ilioinguinal ligament to help keep that area covered, so I would have a good base for placement of ProGrip. Then the ProGrip  was placed so that it was deep to the spermatic cord with the blue line over the pubic tubercle and then laid it down very nicely all the way up to the anterior superior iliac spine laterally and on the conjoined tendon medially after dissecting this out. The ilioinguinal nerve actually ran underneath it, so we left it in position. After that was placed, I did tighten up the internal ring by wrapping the mesh a little bit closer. I was able to place a finger between the mesh and the spermatic cord without difficulty, going all the way through down to the mesh plug. After that was done the nerve appeared to be intact. The external oblique was closed with a continuous running 0 Vicryl suture, taking great care not to involve any nerves or the spermatic cord. Then, Kristy's fascia was brought together with multiple interrupted 3-0 Vicryl sutures, and the skin was closed using 4-0 Monocryl, covered with tincture of benzoin, Steri-Strips and a Tegaderm. All areas were irrigated with antibiotic irrigation before going to the next layer. Good hemostasis was achieved throughout the procedure. All areas were infiltrated with Marcaine before going to the next layer. The patient tolerated the procedure very well. Plan is to discharge him home today. No lifting more than 20 pounds for 3 weeks. We will see him back in approximately 2 weeks. He did receive antibiotics preoperatively.           ENRRIQUE BOSTON MD

## 2017-05-02 NOTE — MR AVS SNAPSHOT
After Visit Summary   5/2/2017    Raul Aviles    MRN: [id number]           Patient Information     Date Of Birth          1985        Visit Information        Provider Department      5/2/2017 8:30 AM Nelson Baca MD [facility]        Care Instructions    Raul is a 31 year old male who is status post left inguinal hernia repair with mesh plug and mesh overlay with no chills and nofever.      Patient's  Pain is  improving.  Appetite is  Improving.  Surgery in April 19 and starting to get electric shocks and now is still getting them and feels some burning.  More with sitting and first stands up.  Motrin is helping.  Stopped icing a few days ago.   Has some tenderness above and to the left of the incision  And patient feels like it feels hard there.  The burning and shock sensation only lasts seconds.      Wound(s)  Is/are   clean dry and intact with no evidence of infection.    Questions were answered and discussion of no lifting more than 20 pounds for 6 weeks after surgery.  Patient is a .          Plan: follow up as needed.  Let us know or follow up with me if you are still having the pain.  We can talk about medications that may help and about a pain clinic but this will hopefully resolve on its own with time.    Please call Chery's number at  if getting worse.      Use antibiotic ointment on wound at night.           Follow-ups after your visit        Who to contact     If you have questions or need follow up information about today's clinic visit or your schedule please contact [facility] directly at [phone].  Normal or non-critical lab and imaging results will be communicated to you by MyChart, letter or phone within 4 business days after the clinic has received the results. If you do not hear from us within 7 days, please contact the clinic through MyChart or phone. If you have a critical or abnormal lab result, we will  "notify you by phone as soon as possible.  Submit refill requests through [employer] or call your pharmacy and they will forward the refill request to us. Please allow 3 business days for your refill to be completed.          Additional Information About Your Visit        Care EveryWhere ID     This is your Care EveryWhere ID. This could be used by other organizations to access your [facility] medical records  [id number]        Your Vitals Were     Pulse Height BMI (Body Mass Index)             70 5' 10\" (1.778 m) 25.11 kg/m2          Blood Pressure from Last 3 Encounters:   05/02/17 133/80   04/19/17 133/79   04/06/17 112/71    Weight from Last 3 Encounters:   05/02/17 175 lb (79.4 kg)   04/12/17 175 lb (79.4 kg)   04/06/17 179 lb (81.2 kg)              Today, you had the following     No orders found for display       Primary Care Provider Office Phone # Fax #    [facility] [phone] [address] [facility]. [facility] [address]        Thank you!     Thank you for choosing [facility]  for your care. Our goal is always to provide you with excellent care. Hearing back from our patients is one way we can continue to improve our services. Please take a few minutes to complete the written survey that you may receive in the mail after your visit with us. Thank you!             Your Updated Medication List - Protect others around you: Learn how to safely use, store and throw away your medicines at www.disposemymeds.org.          This list is accurate as of: 5/2/17  8:51 AM.  Always use your most recent med list.                   Brand Name Dispense Instructions for use    oxyCODONE-acetaminophen 5-325 MG per tablet    PERCOCET    40 tablet    Take 1-2 tablets by mouth every 6 hours as needed         "

## 2017-05-02 NOTE — PATIENT INSTRUCTIONS
Raul is a 31 year old male who is status post left inguinal hernia repair with mesh plug and mesh overlay with no chills and nofever.      Patient's  Pain is  improving.  Appetite is  Improving.  Surgery in April 19 and starting to get electric shocks and now is still getting them and feels some burning.  More with sitting and first stands up.  Motrin is helping.  Stopped icing a few days ago.   Has some tenderness above and to the left of the incision  And patient feels like it feels hard there.  The burning and shock sensation only lasts seconds.      Wound(s)  Is/are   clean dry and intact with no evidence of infection.    Questions were answered and discussion of no lifting more than 20 pounds for 6 weeks after surgery.  Patient is a .          Plan: follow up as needed.  Let us know or follow up with me if you are still having the pain.  We can talk about medications that may help and about a pain clinic but this will hopefully resolve on its own with time.    Please call Chery's number at  if getting worse.      Use antibiotic ointment on wound at night.

## 2018-01-04 ENCOUNTER — OFFICE VISIT (OUTPATIENT)
Dept: FAMILY MEDICINE | Facility: CLINIC | Age: 33
End: 2018-01-04
Payer: COMMERCIAL

## 2018-01-04 VITALS
WEIGHT: 172 LBS | BODY MASS INDEX: 24.62 KG/M2 | SYSTOLIC BLOOD PRESSURE: 126 MMHG | OXYGEN SATURATION: 100 % | HEART RATE: 96 BPM | DIASTOLIC BLOOD PRESSURE: 84 MMHG | HEIGHT: 70 IN

## 2018-01-04 DIAGNOSIS — R53.83 FATIGUE, UNSPECIFIED TYPE: Primary | ICD-10-CM

## 2018-01-04 DIAGNOSIS — G47.9 SLEEP DISTURBANCE: ICD-10-CM

## 2018-01-04 LAB
ALBUMIN SERPL-MCNC: 4.6 G/DL (ref 3.4–5)
ALP SERPL-CCNC: 75 U/L (ref 40–150)
ALT SERPL W P-5'-P-CCNC: 23 U/L (ref 0–70)
ANION GAP SERPL CALCULATED.3IONS-SCNC: 10 MMOL/L (ref 3–14)
AST SERPL W P-5'-P-CCNC: 16 U/L (ref 0–45)
BASOPHILS # BLD AUTO: 0 10E9/L (ref 0–0.2)
BASOPHILS NFR BLD AUTO: 0.4 %
BILIRUB SERPL-MCNC: 0.4 MG/DL (ref 0.2–1.3)
BUN SERPL-MCNC: 14 MG/DL (ref 7–30)
CALCIUM SERPL-MCNC: 8.9 MG/DL (ref 8.5–10.1)
CHLORIDE SERPL-SCNC: 100 MMOL/L (ref 94–109)
CO2 SERPL-SCNC: 29 MMOL/L (ref 20–32)
CREAT SERPL-MCNC: 1.01 MG/DL (ref 0.66–1.25)
DIFFERENTIAL METHOD BLD: NORMAL
EOSINOPHIL # BLD AUTO: 0.2 10E9/L (ref 0–0.7)
EOSINOPHIL NFR BLD AUTO: 1.8 %
ERYTHROCYTE [DISTWIDTH] IN BLOOD BY AUTOMATED COUNT: 13 % (ref 10–15)
GFR SERPL CREATININE-BSD FRML MDRD: 86 ML/MIN/1.7M2
GLUCOSE SERPL-MCNC: 92 MG/DL (ref 70–99)
HCT VFR BLD AUTO: 44.4 % (ref 40–53)
HGB BLD-MCNC: 15.7 G/DL (ref 13.3–17.7)
LYMPHOCYTES # BLD AUTO: 1.9 10E9/L (ref 0.8–5.3)
LYMPHOCYTES NFR BLD AUTO: 22.4 %
MCH RBC QN AUTO: 28.7 PG (ref 26.5–33)
MCHC RBC AUTO-ENTMCNC: 35.4 G/DL (ref 31.5–36.5)
MCV RBC AUTO: 81 FL (ref 78–100)
MONOCYTES # BLD AUTO: 0.9 10E9/L (ref 0–1.3)
MONOCYTES NFR BLD AUTO: 10.7 %
NEUTROPHILS # BLD AUTO: 5.5 10E9/L (ref 1.6–8.3)
NEUTROPHILS NFR BLD AUTO: 64.7 %
PLATELET # BLD AUTO: 204 10E9/L (ref 150–450)
POTASSIUM SERPL-SCNC: 3.6 MMOL/L (ref 3.4–5.3)
PROT SERPL-MCNC: 7.7 G/DL (ref 6.8–8.8)
RBC # BLD AUTO: 5.47 10E12/L (ref 4.4–5.9)
SODIUM SERPL-SCNC: 139 MMOL/L (ref 133–144)
TSH SERPL DL<=0.005 MIU/L-ACNC: 1.02 MU/L (ref 0.4–4)
WBC # BLD AUTO: 8.5 10E9/L (ref 4–11)

## 2018-01-04 PROCEDURE — 82306 VITAMIN D 25 HYDROXY: CPT | Performed by: PHYSICIAN ASSISTANT

## 2018-01-04 PROCEDURE — 80050 GENERAL HEALTH PANEL: CPT | Performed by: PHYSICIAN ASSISTANT

## 2018-01-04 PROCEDURE — 36415 COLL VENOUS BLD VENIPUNCTURE: CPT | Performed by: PHYSICIAN ASSISTANT

## 2018-01-04 PROCEDURE — 99213 OFFICE O/P EST LOW 20 MIN: CPT | Performed by: PHYSICIAN ASSISTANT

## 2018-01-04 ASSESSMENT — ANXIETY QUESTIONNAIRES
5. BEING SO RESTLESS THAT IT IS HARD TO SIT STILL: SEVERAL DAYS
IF YOU CHECKED OFF ANY PROBLEMS ON THIS QUESTIONNAIRE, HOW DIFFICULT HAVE THESE PROBLEMS MADE IT FOR YOU TO DO YOUR WORK, TAKE CARE OF THINGS AT HOME, OR GET ALONG WITH OTHER PEOPLE: SOMEWHAT DIFFICULT
7. FEELING AFRAID AS IF SOMETHING AWFUL MIGHT HAPPEN: NOT AT ALL
2. NOT BEING ABLE TO STOP OR CONTROL WORRYING: NOT AT ALL
1. FEELING NERVOUS, ANXIOUS, OR ON EDGE: MORE THAN HALF THE DAYS
GAD7 TOTAL SCORE: 9
6. BECOMING EASILY ANNOYED OR IRRITABLE: MORE THAN HALF THE DAYS
3. WORRYING TOO MUCH ABOUT DIFFERENT THINGS: NEARLY EVERY DAY

## 2018-01-04 ASSESSMENT — PATIENT HEALTH QUESTIONNAIRE - PHQ9
5. POOR APPETITE OR OVEREATING: SEVERAL DAYS
SUM OF ALL RESPONSES TO PHQ QUESTIONS 1-9: 10

## 2018-01-04 NOTE — NURSING NOTE
"Chief Complaint   Patient presents with     Fatigue       Initial /84  Pulse 96  Ht 5' 10\" (1.778 m)  Wt 172 lb (78 kg)  SpO2 100%  BMI 24.68 kg/m2 Estimated body mass index is 24.68 kg/(m^2) as calculated from the following:    Height as of this encounter: 5' 10\" (1.778 m).    Weight as of this encounter: 172 lb (78 kg).  Medication Reconciliation: complete  Felicia Arana CMA    "

## 2018-01-04 NOTE — PROGRESS NOTES
"SUBJECTIVE:                                                    Raul Aviles is a 32 year old male who presents to clinic today for the following health issues:    Fatigue      Duration: years - getting worse     Description (location/character/radiation): tired all the time. Feels like he is \"running on 70%\" efficiently every day.     Intensity:  moderate    Accompanying signs and symptoms:     History (similar episodes/previous evaluation): pt was on depression/axiety in the past - unsure if this is a cause     Precipitating or alleviating factors: None    Therapies tried and outcome: None   Pt would like to discuss with provider - history of over 5 yrs of ETOH and pain meds. 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 Overall feels like life and family is better.     Addiction stared after a sacral fracture and was Rx'd them.     History of seeing psychiatrist int he past and was on adderral x 2 yrs and wellbutrin and prozac for couple years. No help.     Unsure if he sleeps well, snores.     Problem list and histories reviewed & adjusted, as indicated.  Additional history: as documented    Patient Active Problem List   Diagnosis     Left inguinal hernia     S/P left inguinal herniorrhaphy     Past Surgical History:   Procedure Laterality Date     APPENDECTOMY       HERNIORRHAPHY INGUINAL Left 4/19/2017    Procedure: HERNIORRHAPHY INGUINAL;  Open left inguinal hernia repair;  Surgeon: Nelson Baca MD;  Location:  OR     ORTHOPEDIC SURGERY      R shoulder surgery     ORTHOPEDIC SURGERY      R wrist surgery     ORTHOPEDIC SURGERY      Both knee surgery       Social History   Substance Use Topics     Smoking status: Never Smoker     Smokeless tobacco: Never Used     Alcohol use No     History reviewed. No pertinent family history.      No current outpatient prescriptions on file.     No Known Allergies      ROS:  Constitutional, HEENT, cardiovascular, pulmonary, gi and gu systems are negative, " "except as otherwise noted.      OBJECTIVE:   /84  Pulse 96  Ht 5' 10\" (1.778 m)  Wt 172 lb (78 kg)  SpO2 100%  BMI 24.68 kg/m2  Body mass index is 24.68 kg/(m^2).  GENERAL: healthy, alert and no distress  NECK: no adenopathy, no asymmetry, masses, or scars and thyroid normal to palpation  RESP: lungs clear to auscultation - no rales, rhonchi or wheezes  CV: regular rate and rhythm, normal S1 S2, no S3 or S4, no murmur, click or rub, no peripheral edema and peripheral pulses strong    Diagnostic Test Results:  Results for orders placed or performed in visit on 01/04/18 (from the past 24 hour(s))   CBC with platelets differential   Result Value Ref Range    WBC 8.5 4.0 - 11.0 10e9/L    RBC Count 5.47 4.4 - 5.9 10e12/L    Hemoglobin 15.7 13.3 - 17.7 g/dL    Hematocrit 44.4 40.0 - 53.0 %    MCV 81 78 - 100 fl    MCH 28.7 26.5 - 33.0 pg    MCHC 35.4 31.5 - 36.5 g/dL    RDW 13.0 10.0 - 15.0 %    Platelet Count 204 150 - 450 10e9/L    Diff Method Automated Method     % Neutrophils 64.7 %    % Lymphocytes 22.4 %    % Monocytes 10.7 %    % Eosinophils 1.8 %    % Basophils 0.4 %    Absolute Neutrophil 5.5 1.6 - 8.3 10e9/L    Absolute Lymphocytes 1.9 0.8 - 5.3 10e9/L    Absolute Monocytes 0.9 0.0 - 1.3 10e9/L    Absolute Eosinophils 0.2 0.0 - 0.7 10e9/L    Absolute Basophils 0.0 0.0 - 0.2 10e9/L       ASSESSMENT/PLAN:         ICD-10-CM    1. Fatigue, unspecified type R53.83 CBC with platelets differential     Comprehensive metabolic panel     TSH with free T4 reflex     Vitamin D Deficiency     SLEEP EVALUATION & MANAGEMENT REFERRAL - [facility]  [phone] (Age 15 and up)   2. Sleep disturbance G47.9 SLEEP EVALUATION & MANAGEMENT REFERRAL - [facility]  [phone] (Age 15 and up)   labs pending  Discussed follow up  With sleep med  Warm clinic hand off to Irma ([facility])  Recheck dependant on results.     HIWOT Coker " [facility]

## 2018-01-04 NOTE — MR AVS SNAPSHOT
After Visit Summary   1/4/2018    Raul Aviles    MRN: [id number]           Patient Information     Date Of Birth          1985        Visit Information        Provider Department      1/4/2018 3:50 PM Tonny Carbone PA-C [facility] [place] [facility]        Today's Diagnoses     Fatigue, unspecified type    -  1    Sleep disturbance           Follow-ups after your visit        Additional Services     SLEEP EVALUATION & MANAGEMENT REFERRAL - [facility]  [phone] (Age 15 and up)       Please be aware that coverage of these services is subject to the terms and limitations of your health insurance plan.  Call member services at your health plan with any benefit or coverage questions.    Please bring the following to your appointment:    >>   List of current medications   >>   This referral request   >>   Any documents/labs given to you for this referral                  Future tests that were ordered for you today     Open Future Orders        Priority Expected Expires Ordered    SLEEP EVALUATION & MANAGEMENT REFERRAL - [facility]  [phone] (Age 15 and up) Routine  1/4/2019 1/4/2018            Who to contact     If you have questions or need follow up information about today's clinic visit or your schedule please contact [facility] directly at [phone].  Normal or non-critical lab and imaging results will be communicated to you by MyChart, letter or phone within 4 business days after the clinic has received the results. If you do not hear from us within 7 days, please contact the clinic through MyChart or phone. If you have a critical or abnormal lab result, we will notify you by phone as soon as possible.  Submit refill requests through [employer] or call your pharmacy and they will forward the refill request to us. Please allow 3 business days for your refill to be completed.          Additional  "Information About Your Visit        MyChart Information     [employer] lets you send messages to your doctor, view your test results, renew your prescriptions, schedule appointments and more. To sign up, go to [email or web address] . Click on \"Log in\" on the left side of the screen, which will take you to the Welcome page. Then click on \"Sign up Now\" on the right side of the page.     You will be asked to enter the access code listed below, as well as some personal information. Please follow the directions to create your username and password.     Your access code is: [email or web address]  Expires: 2018  4:25 PM     Your access code will  in 90 days. If you need help or a new code, please call your [facility] clinic or [phone].        Care EveryWhere ID     This is your Care EveryWhere ID. This could be used by other organizations to access your [facility] medical records  [id number]        Your Vitals Were     Pulse Height Pulse Oximetry BMI (Body Mass Index)          96 5' 10\" (1.778 m) 100% 24.68 kg/m2         Blood Pressure from Last 3 Encounters:   18 126/84   17 133/80   17 133/79    Weight from Last 3 Encounters:   18 172 lb (78 kg)   17 175 lb (79.4 kg)   17 175 lb (79.4 kg)              We Performed the Following     CBC with platelets differential     Comprehensive metabolic panel     TSH with free T4 reflex     Vitamin D Deficiency        Primary Care Provider Office Phone # Fax #    [facility] [phone] [address] [address] [address]        Equal Access to Services     JOSE OVIEDO : Hadii louisa Au, nateda lujanelladaha, qayfnta kaalmada melissa peñaloza. So [facility] [phone].    ATENCIÓN: Si habla español, tiene a latham disposición servicios gratuitos de asistencia lingüística. Llame al [phone].    We comply with applicable federal civil rights laws and [place] laws. We do " not discriminate on the basis of race, color, national origin, age, disability, sex, sexual orientation, or gender identity.            Thank you!     Thank you for choosing [facility]  for your care. Our goal is always to provide you with excellent care. Hearing back from our patients is one way we can continue to improve our services. Please take a few minutes to complete the written survey that you may receive in the mail after your visit with us. Thank you!             Your Updated Medication List - Protect others around you: Learn how to safely use, store and throw away your medicines at www.disposemymeds.org.      Notice  As of 1/4/2018  4:25 PM    You have not been prescribed any medications.

## 2018-01-05 LAB — DEPRECATED CALCIDIOL+CALCIFEROL SERPL-MC: 53 UG/L (ref 20–75)

## 2018-01-05 ASSESSMENT — ANXIETY QUESTIONNAIRES: GAD7 TOTAL SCORE: 9

## 2018-05-21 ENCOUNTER — OFFICE VISIT (OUTPATIENT)
Dept: FAMILY MEDICINE | Facility: CLINIC | Age: 33
End: 2018-05-21
Payer: COMMERCIAL

## 2018-05-21 VITALS
BODY MASS INDEX: 25.4 KG/M2 | TEMPERATURE: 97.2 F | DIASTOLIC BLOOD PRESSURE: 68 MMHG | HEART RATE: 68 BPM | WEIGHT: 177 LBS | SYSTOLIC BLOOD PRESSURE: 111 MMHG | RESPIRATION RATE: 16 BRPM
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
## 2018-05-21 DIAGNOSIS — S70.362A TICK BITE OF LEFT THIGH, INITIAL ENCOUNTER: Primary | ICD-10-CM

## 2018-05-21 LAB
B BURGDOR IGG SER QL IB: NORMAL
B BURGDOR IGG SER QL IB: NORMAL
B BURGDOR IGM SER QL IB: NORMAL

## 2018-05-21 PROCEDURE — 36415 COLL VENOUS BLD VENIPUNCTURE: CPT | Performed by: NURSE PRACTITIONER

## 2018-05-21 PROCEDURE — 99000 SPECIMEN HANDLING OFFICE-LAB: CPT | Performed by: NURSE PRACTITIONER

## 2018-05-21 PROCEDURE — 99213 OFFICE O/P EST LOW 20 MIN: CPT | Performed by: NURSE PRACTITIONER

## 2018-05-21 PROCEDURE — 86617 LYME DISEASE ANTIBODY: CPT | Mod: 90 | Performed by: NURSE PRACTITIONER

## 2018-05-21 PROCEDURE — 86618 LYME DISEASE ANTIBODY: CPT | Performed by: NURSE PRACTITIONER

## 2018-05-21 RX ORDER — DOXYCYCLINE 100 MG/1
100 CAPSULE ORAL 2 TIMES DAILY
Qty: 28 CAPSULE | Refills: 0 | Status: SHIPPED | OUTPATIENT
Start: 2018-05-21 | End: 2018-06-04

## 2018-05-21 ASSESSMENT — PAIN SCALES - GENERAL: PAINLEVEL: MILD PAIN (3)

## 2018-05-21 NOTE — LETTER
May 23, 2018      Raul Aviles  [address]  [place] [address]        Dear ,    We are writing to inform you of your test results.    negative lyme's test,please continue full course of doxycyline     Resulted Orders   Lyme Confirm IgG by Immunoblot   Result Value Ref Range    Lyme Confirm IgG by Immunoblot Canceled, Test credited       Comment:      WRONG TEST ORDERED.MM   Lyme Conf IgG and IgM by Immunoblot   Result Value Ref Range    Lyme Confirm IgG by Immunoblot Canceled, Test credited       Comment:      WRONG TEST ORDERED.MM    Lyme Confirm IgM by Immunoblot Canceled, Test credited       Comment:      WRONG TEST ORDERED.MM   Lyme Disease Radha with reflex to WB Serum   Result Value Ref Range    Lyme Disease Antibodies Serum 0.01 0.00 - 0.89      Comment:      Negative, Absence of detectable Borrelia burdorferi antibodies. A negative   result does not exclude the possibility of Borrelia burgdorferi infection. If   early Lyme disease is suspected, a second sample should be collected and   tested 2 to 4 weeks later.         If you have any questions or concerns, please call the clinic at the number listed above.       Sincerely,        JANESSA Sotelo CNP

## 2018-05-21 NOTE — PROGRESS NOTES
SUBJECTIVE:   Raul Aviles is a 32 year old male who presents to clinic today for the following health issues:      Patient presents with:  Insect Bites: pt found tick yesterday, c/o rash at the site, nausea and body aches  He reports working outside and pulling a tick off yesterday and also another one a couple weeks ago. He has had nausea, headache, body aches since last Wednesday. No fever  No previous lyme's    Problem list and histories reviewed & adjusted, as indicated.  Additional history: as documented    Patient Active Problem List   Diagnosis     Left inguinal hernia     S/P left inguinal herniorrhaphy     Past Surgical History:   Procedure Laterality Date     APPENDECTOMY       HERNIORRHAPHY INGUINAL Left 4/19/2017    Procedure: HERNIORRHAPHY INGUINAL;  Open left inguinal hernia repair;  Surgeon: Nelson Baca MD;  Location:  OR     ORTHOPEDIC SURGERY      R shoulder surgery     ORTHOPEDIC SURGERY      R wrist surgery     ORTHOPEDIC SURGERY      Both knee surgery       Social History   Substance Use Topics     Smoking status: Never Smoker     Smokeless tobacco: Never Used     Alcohol use No     No family history on file.        Reviewed and updated as needed this visit by clinical staff  Tobacco       Reviewed and updated as needed this visit by Provider         ROS:  Constitutional, HEENT, cardiovascular, pulmonary, gi and gu systems are negative, except as otherwise noted.    OBJECTIVE:     /68  Pulse 68  Temp 97.2  F (36.2  C) (Oral)  Resp 16  Wt 177 lb (80.3 kg)  BMI 25.4 kg/m2  Body mass index is 25.4 kg/(m^2).   GENERAL:  Alert and no distress  EYES: Eyes grossly normal to inspection, PERRL and conjunctivae and sclerae normal  HENT: ear canals and TM's normal, nose and mouth without ulcers or lesions  NECK: no adenopathy, no asymmetry, masses, or scars and thyroid normal to palpation  RESP: lungs clear to auscultation - no rales, rhonchi or wheezes  CV: regular rate and  rhythm, normal S1 S2, no S3 or S4, no murmur, click or rub, no peripheral edema and peripheral pulses strong  SKIN: erythematous area left inside thigh    Lyme's pending.    ASSESSMENT:   (S70.362A,  W57.XXXA) Tick bite of left thigh, initial encounter  (primary encounter diagnosis)      PLAN:     Plan: Lyme Confirm IgG by Immunoblot,  doxycycline (VIBRAMYCIN) 100 MG capsule BID X 14 days. Treated due to 2 recent exposure and now symptomatic fever, chills, joint aches, headache.     See Patient Instructions    JANESSA Sotelo [facility]

## 2018-05-21 NOTE — MR AVS SNAPSHOT
"              After Visit Summary   2018    Raul Aviles    MRN: [id number]           Patient Information     Date Of Birth          1985        Visit Information        Provider Department      2018 11:00 AM Carmen Carrera APRN CNP [facility]        Today's Diagnoses     Tick bite of left thigh, initial encounter    -  1       Follow-ups after your visit        Who to contact     If you have questions or need follow up information about today's clinic visit or your schedule please contact [facility] directly at [phone].  Normal or non-critical lab and imaging results will be communicated to you by [employer], letter or phone within 4 business days after the clinic has received the results. If you do not hear from us within 7 days, please contact the clinic through [employer] or phone. If you have a critical or abnormal lab result, we will notify you by phone as soon as possible.  Submit refill requests through [employer] or call your pharmacy and they will forward the refill request to us. Please allow 3 business days for your refill to be completed.          Additional Information About Your Visit        MyChart Information     [employer] lets you send messages to your doctor, view your test results, renew your prescriptions, schedule appointments and more. To sign up, go to [email or web address] . Click on \"Log in\" on the left side of the screen, which will take you to the Welcome page. Then click on \"Sign up Now\" on the right side of the page.     You will be asked to enter the access code listed below, as well as some personal information. Please follow the directions to create your username and password.     Your access code is: [email or web address]  Expires: 2018 11:20 AM     Your access code will  in 90 days. If you need help or a new code, please call your [facility] or [phone].        Care EveryWhere ID     This is your Care EveryWhere ID. This could " be used by other organizations to access your [facility] medical records  [id number]        Your Vitals Were     Pulse Temperature Respirations BMI (Body Mass Index)          68 97.2  F (36.2  C) (Oral) 16 25.4 kg/m2         Blood Pressure from Last 3 Encounters:   05/21/18 111/68   01/04/18 126/84   05/02/17 133/80    Weight from Last 3 Encounters:   05/21/18 177 lb (80.3 kg)   01/04/18 172 lb (78 kg)   05/02/17 175 lb (79.4 kg)              We Performed the Following     Lyme Confirm IgG by Immunoblot          Today's Medication Changes          These changes are accurate as of 5/21/18 11:20 AM.  If you have any questions, ask your nurse or doctor.               Start taking these medicines.        Dose/Directions    doxycycline 100 MG capsule   Commonly known as:  VIBRAMYCIN   Used for:  Tick bite of left thigh, initial encounter   Started by:  Carmen Carrera APRN CNP        Dose:  100 mg   Take 1 capsule (100 mg) by mouth 2 times daily for 14 days   Quantity:  28 capsule   Refills:  0            Where to get your medicines      These medications were sent to [facility] [id number] [place] [address] [facility]  [address] [facility] [address]    Hours:  24-hours Phone:  [phone]     doxycycline 100 MG capsule                Primary Care Provider Office Phone # Fax #    [facility] [phone] [address] [facility] [address]        Equal Access to Services     LEONEL OVIEDO AH: Hadii aad ku hadasho Soomaali, waaxda luqadaha, qaybta kaalmada adeegyada, waxay idipo bishop. So [facility] [phone].    ATENCIÓN: Si habla español, tiene a latham disposición servicios gratuitos de asistencia lingüística. Llame al [phone].    We comply with applicable federal civil rights laws and [place] laws. We do not discriminate on the basis of race, color, national origin, age, disability, sex,  sexual orientation, or gender identity.            Thank you!     Thank you for choosing [facility]  [facility] your [facility]. Our goal is always to provide you with excellent care. Hearing back from our patients is one way we can continue to improve our services. Please take a few minutes to complete the written survey that you may receive in the mail after your visit with us. Thank you!             Your Updated Medication List - Protect others around you: Learn how to safely use, store and throw away your medicines at www.disposemymeds.org.          This list is accurate as of 5/21/18 11:20 AM.  Always use your most recent med list.                   Brand Name Dispense Instructions for use Diagnosis    doxycycline 100 MG capsule    VIBRAMYCIN    28 capsule    Take 1 capsule (100 mg) by mouth 2 times daily for 14 days    Tick bite of left thigh, initial encounter

## 2018-05-22 LAB — B BURGDOR IGG+IGM SER QL: 0.01 (ref 0–0.89)

## 2018-11-19 ENCOUNTER — OFFICE VISIT (OUTPATIENT)
Dept: FAMILY MEDICINE | Facility: CLINIC | Age: 33
End: 2018-11-19
Payer: COMMERCIAL

## 2018-11-19 VITALS
OXYGEN SATURATION: 100 % | SYSTOLIC BLOOD PRESSURE: 118 MMHG | HEART RATE: 59 BPM | BODY MASS INDEX: 24.25 KG/M2 | WEIGHT: 169 LBS | RESPIRATION RATE: 16 BRPM | DIASTOLIC BLOOD PRESSURE: 72 MMHG | TEMPERATURE: 97.2 F

## 2018-11-19 DIAGNOSIS — F33.1 MODERATE EPISODE OF RECURRENT MAJOR DEPRESSIVE DISORDER (H): Primary | ICD-10-CM

## 2018-11-19 PROCEDURE — 99213 OFFICE O/P EST LOW 20 MIN: CPT | Performed by: PHYSICIAN ASSISTANT

## 2018-11-19 ASSESSMENT — PATIENT HEALTH QUESTIONNAIRE - PHQ9
SUM OF ALL RESPONSES TO PHQ QUESTIONS 1-9: 15
5. POOR APPETITE OR OVEREATING: NEARLY EVERY DAY

## 2018-11-19 ASSESSMENT — ANXIETY QUESTIONNAIRES
7. FEELING AFRAID AS IF SOMETHING AWFUL MIGHT HAPPEN: NEARLY EVERY DAY
1. FEELING NERVOUS, ANXIOUS, OR ON EDGE: NEARLY EVERY DAY
GAD7 TOTAL SCORE: 18
5. BEING SO RESTLESS THAT IT IS HARD TO SIT STILL: SEVERAL DAYS
6. BECOMING EASILY ANNOYED OR IRRITABLE: MORE THAN HALF THE DAYS
2. NOT BEING ABLE TO STOP OR CONTROL WORRYING: NEARLY EVERY DAY
3. WORRYING TOO MUCH ABOUT DIFFERENT THINGS: NEARLY EVERY DAY
IF YOU CHECKED OFF ANY PROBLEMS ON THIS QUESTIONNAIRE, HOW DIFFICULT HAVE THESE PROBLEMS MADE IT FOR YOU TO DO YOUR WORK, TAKE CARE OF THINGS AT HOME, OR GET ALONG WITH OTHER PEOPLE: VERY DIFFICULT

## 2018-11-19 ASSESSMENT — PAIN SCALES - GENERAL: PAINLEVEL: NO PAIN (0)

## 2018-11-19 NOTE — PROGRESS NOTES
SUBJECTIVE:   Raul Aviles is a 33 year old male who presents to clinic today for the following health issues:    History of depression and substance abuse in the past. He was on suboxone and antidepressants in the past. He has been attending AA meetings and was able to get off of medication. He recently broke up with his girlfriend and moved in with his parents. His friend also passed away due to substance abuse.   He denies use of alcohol or narcotics. He admits that he was abusing adderall in the past also.   He has a supportive family and friends and they encouraged him to start counseling and get this appointment today.   He has crying spells. He has suicidal thoughts off and on. No plan. No plan of harming others. He was treated with sertraline in the past. He does not recall and significant side effects. He has a counseling appointment this week already scheduled.     Depression and Anxiety Follow-Up    Status since last visit: Worsened     Other associated symptoms:weight loss, irritated     Complicating factors:     Significant life event: Yes-  Friend passing, break up with girlfriend     Current substance abuse: None    PHQ 1/4/2018   PHQ-9 Total Score 10   Q9: Suicide Ideation Not at all     MATEUSZ-7 SCORE 1/4/2018   Total Score 9     PHQ-9  English  PHQ-9   Any Language  MATEUSZ-7  Suicide Assessment Five-step Evaluation and Treatment (SAFE-T)    Amount of exercise or physical activity:     Problems taking medications regularly: No    Medication side effects: none    Diet:         Problem list and histories reviewed & adjusted, as indicated.  Additional history: as documented    Patient Active Problem List   Diagnosis     Left inguinal hernia     S/P left inguinal herniorrhaphy     Past Surgical History:   Procedure Laterality Date     APPENDECTOMY       HERNIORRHAPHY INGUINAL Left 4/19/2017    Procedure: HERNIORRHAPHY INGUINAL;  Open left inguinal hernia repair;  Surgeon: Nelson Baca MD;   Location: [facility] OR     ORTHOPEDIC SURGERY      R shoulder surgery     ORTHOPEDIC SURGERY      R wrist surgery     ORTHOPEDIC SURGERY      Both knee surgery       Social History   Substance Use Topics     Smoking status: Never Smoker     Smokeless tobacco: Never Used     Alcohol use No     History reviewed. No pertinent family history.      No Known Allergies    Reviewed and updated as needed this visit by clinical staff       Reviewed and updated as needed this visit by Provider         ROS:  Constitutional, HEENT, cardiovascular, pulmonary, GI, , musculoskeletal, neuro, skin, endocrine and psych systems are negative, except as otherwise noted.    OBJECTIVE:     /72  Pulse 59  Temp 97.2  F (36.2  C) (Oral)  Resp 16  Wt 169 lb (76.7 kg)  SpO2 100%  BMI 24.25 kg/m2  Body mass index is 24.25 kg/(m^2).  GENERAL: healthy, alert and no distress  MS: no gross musculoskeletal defects noted, no edema  MS: extremities normal- no gross deformities noted  PSYCH: mentation appears normal, affect flat, judgement and insight intact and appearance well groomed    Diagnostic Test Results:  none     ASSESSMENT/PLAN:       1. Moderate episode of recurrent major depressive disorder (H)  He will get back on the sertraline. Side effects and how to take the medication discussed.  Plan follow up in 4-6 weeks with primary provider.   He has counseling scheduled this week.   He also has crisis information and a good support system.   No current use of alcohol or drugs.   He will notify if any concerns in the meantime.   - sertraline (ZOLOFT) 50 MG tablet; Take 1/2 tablet (25 mg) for 1 weeks, then increase to 1 tablet orally daily  Dispense: 30 tablet; Refill: 1    20 minutes spent with Raul today. Over 50% of time taken for discussion of above, counseling and coordination of care.       Kristen M. Kehr, PA-C  [facility]

## 2018-11-19 NOTE — NURSING NOTE
"Chief Complaint   Patient presents with     Depression     Anxiety       Initial /72  Pulse 59  Temp 97.2  F (36.2  C) (Oral)  Resp 16  Wt 169 lb (76.7 kg)  SpO2 100%  BMI 24.25 kg/m2 Estimated body mass index is 24.25 kg/(m^2) as calculated from the following:    Height as of 1/4/18: 5' 10\" (1.778 m).    Weight as of this encounter: 169 lb (76.7 kg).  Medication Reconciliation: complete    SARAH Camacho MA    "

## 2018-11-19 NOTE — MR AVS SNAPSHOT
"              After Visit Summary   2018    Raul Aviles    MRN: [id number]           Patient Information     Date Of Birth          1985        Visit Information        Provider Department      2018 10:20 AM Kehr, Kristen M, PA-C [facility]        Today's Diagnoses     Moderate episode of recurrent major depressive disorder (H)    -  1       Follow-ups after your visit        Follow-up notes from your care team     Return in about 1 month (around 2018) for depression / anxiety.      Who to contact     If you have questions or need follow up information about today's clinic visit or your schedule please contact [facility] directly at [phone].  Normal or non-critical lab and imaging results will be communicated to you by MyChart, letter or phone within 4 business days after the clinic has received the results. If you do not hear from us within 7 days, please contact the clinic through MyChart or phone. If you have a critical or abnormal lab result, we will notify you by phone as soon as possible.  Submit refill requests through [employer] or call your pharmacy and they will forward the refill request to us. Please allow 3 business days for your refill to be completed.          Additional Information About Your Visit        MyChart Information     [employer] lets you send messages to your doctor, view your test results, renew your prescriptions, schedule appointments and more. To sign up, go to [email or web address] . Click on \"Log in\" on the left side of the screen, which will take you to the Welcome page. Then click on \"Sign up Now\" on the right side of the page.     You will be asked to enter the access code listed below, as well as some personal information. Please follow the directions to create your username and password.     Your access code is: [email or web address]  Expires: 2019 10:47 AM     Your access code will  in 90 days. If you need help or a new " code, please call your [facility] or [phone].        Care EveryWhere ID     This is your Care EveryWhere ID. This could be used by other organizations to access your [place] medical records  [id number]        Your Vitals Were     Pulse Temperature Respirations Pulse Oximetry BMI (Body Mass Index)       59 97.2  F (36.2  C) (Oral) 16 100% 24.25 kg/m2        Blood Pressure from Last 3 Encounters:   11/19/18 118/72   05/21/18 111/68   01/04/18 126/84    Weight from Last 3 Encounters:   11/19/18 169 lb (76.7 kg)   05/21/18 177 lb (80.3 kg)   01/04/18 172 lb (78 kg)              Today, you had the following     No orders found for display         Today's Medication Changes          These changes are accurate as of 11/19/18 10:47 AM.  If you have any questions, ask your nurse or doctor.               Start taking these medicines.        Dose/Directions    sertraline 50 MG tablet   Commonly known as:  ZOLOFT   Used for:  Moderate episode of recurrent major depressive disorder (H)   Started by:  Kehr, Kristen M, PA-C        Take 1/2 tablet (25 mg) for 1 weeks, then increase to 1 tablet orally daily   Quantity:  30 tablet   Refills:  1            Where to get your medicines      These medications were sent to [id number] - [facility] [id number] [place] [address] & [facility]  [address] [facility] [address]    Hours:  24-hours Phone:  [phone]     sertraline 50 MG tablet                Primary Care Provider Office Phone # Fax #    [facility] [phone] [address] [facility] [address]        Equal Access to Services     JOSE OVIEDO AH: Tania Au, wavirginiada luqjuan pablo, qaybta kaalmada jh, melissa bishop. So [facility] [phone].    ATENCIÓN: Si habla español, tiene a latham disposición servicios gratuitos de asistencia lingüística. Llame al [phone].    We comply with  applicable federal civil rights laws and [place] laws. We do not discriminate on the basis of race, color, national origin, age, disability, sex, sexual orientation, or gender identity.            Thank you!     Thank you for choosing [facility]  [facility]. Our goal is always to provide you with excellent care. Hearing back from our patients is one way we can continue to improve our services. Please take a few minutes to complete the written survey that you may receive in the mail after your visit with us. Thank you!             Your Updated Medication List - Protect others around you: Learn how to safely use, store and throw away your medicines at www.disposemymeds.org.          This list is accurate as of 11/19/18 10:47 AM.  Always use your most recent med list.                   Brand Name Dispense Instructions for use Diagnosis    sertraline 50 MG tablet    ZOLOFT    30 tablet    Take 1/2 tablet (25 mg) for 1 weeks, then increase to 1 tablet orally daily    Moderate episode of recurrent major depressive disorder (H)

## 2018-11-20 ASSESSMENT — ANXIETY QUESTIONNAIRES: GAD7 TOTAL SCORE: 18

## 2018-12-07 ENCOUNTER — TRANSFERRED RECORDS (OUTPATIENT)
Dept: HEALTH INFORMATION MANAGEMENT | Facility: CLINIC | Age: 33
End: 2018-12-07

## 2019-01-02 ENCOUNTER — OFFICE VISIT (OUTPATIENT)
Dept: FAMILY MEDICINE | Facility: CLINIC | Age: 34
End: 2019-01-02
Payer: COMMERCIAL

## 2019-01-02 VITALS
DIASTOLIC BLOOD PRESSURE: 71 MMHG | RESPIRATION RATE: 14 BRPM | BODY MASS INDEX: 23.05 KG/M2 | SYSTOLIC BLOOD PRESSURE: 129 MMHG | OXYGEN SATURATION: 99 % | TEMPERATURE: 98.6 F | HEIGHT: 70 IN | WEIGHT: 161 LBS | HEART RATE: 67 BPM

## 2019-01-02 DIAGNOSIS — F33.1 MODERATE EPISODE OF RECURRENT MAJOR DEPRESSIVE DISORDER (H): ICD-10-CM

## 2019-01-02 PROCEDURE — 99213 OFFICE O/P EST LOW 20 MIN: CPT | Performed by: PHYSICIAN ASSISTANT

## 2019-01-02 RX ORDER — SERTRALINE HYDROCHLORIDE 100 MG/1
TABLET, FILM COATED ORAL
Qty: 90 TABLET | Refills: 1 | Status: SHIPPED | OUTPATIENT
Start: 2019-01-02 | End: 2019-04-01

## 2019-01-02 ASSESSMENT — PAIN SCALES - GENERAL: PAINLEVEL: NO PAIN (0)

## 2019-01-02 ASSESSMENT — ANXIETY QUESTIONNAIRES
2. NOT BEING ABLE TO STOP OR CONTROL WORRYING: SEVERAL DAYS
7. FEELING AFRAID AS IF SOMETHING AWFUL MIGHT HAPPEN: SEVERAL DAYS
3. WORRYING TOO MUCH ABOUT DIFFERENT THINGS: NOT AT ALL
5. BEING SO RESTLESS THAT IT IS HARD TO SIT STILL: NOT AT ALL
1. FEELING NERVOUS, ANXIOUS, OR ON EDGE: SEVERAL DAYS
IF YOU CHECKED OFF ANY PROBLEMS ON THIS QUESTIONNAIRE, HOW DIFFICULT HAVE THESE PROBLEMS MADE IT FOR YOU TO DO YOUR WORK, TAKE CARE OF THINGS AT HOME, OR GET ALONG WITH OTHER PEOPLE: SOMEWHAT DIFFICULT
GAD7 TOTAL SCORE: 4
6. BECOMING EASILY ANNOYED OR IRRITABLE: SEVERAL DAYS

## 2019-01-02 ASSESSMENT — PATIENT HEALTH QUESTIONNAIRE - PHQ9
5. POOR APPETITE OR OVEREATING: NOT AT ALL
SUM OF ALL RESPONSES TO PHQ QUESTIONS 1-9: 3

## 2019-01-02 ASSESSMENT — MIFFLIN-ST. JEOR: SCORE: 1678.6

## 2019-01-02 NOTE — NURSING NOTE
"Chief Complaint   Patient presents with     Depression     Anxiety       Initial /71   Pulse 67   Temp 98.6  F (37  C) (Oral)   Resp 14   Ht 1.765 m (5' 9.5\")   Wt 73 kg (161 lb)   SpO2 99%   BMI 23.43 kg/m   Estimated body mass index is 23.43 kg/m  as calculated from the following:    Height as of this encounter: 1.765 m (5' 9.5\").    Weight as of this encounter: 73 kg (161 lb).  Medication Reconciliation: complete    SARAH Camacho MA    "

## 2019-01-02 NOTE — PROGRESS NOTES
SUBJECTIVE:   Raul Aviles is a 33 year old male who presents to clinic today for the following health issues:    Raul is here today for follow up after starting sertraline. He is taking the 50 mg dose and feels that he is making good progress with the depression. He is also seeing a therapist and that is going well. He denies side effects. He is considering an increase in the dose.     History of Present Illness     Depression & Anxiety Follow-up:     Depression/Anxiety:  Depression & Anxiety    Status since last visit::  Improved    Other associated symptoms of depression and anxiety::  None    Significant life event::  No    Current substance use::  None       Today's PHQ-9         PHQ-9 Total Score:         PHQ-9 Q9 Suicidal ideation:       Thoughts of suicide or self harm:      Self-harm Plan:        Self-harm Action:          Safety concerns for self or others:            Problem list and histories reviewed & adjusted, as indicated.  Additional history: as documented      Patient Active Problem List   Diagnosis     Left inguinal hernia     S/P left inguinal herniorrhaphy     Past Surgical History:   Procedure Laterality Date     APPENDECTOMY       HERNIORRHAPHY INGUINAL Left 4/19/2017    Procedure: HERNIORRHAPHY INGUINAL;  Open left inguinal hernia repair;  Surgeon: Nelson Baca MD;  Location: [facility] OR     ORTHOPEDIC SURGERY      R shoulder surgery     ORTHOPEDIC SURGERY      R wrist surgery     ORTHOPEDIC SURGERY      Both knee surgery       Social History     Tobacco Use     Smoking status: Never Smoker     Smokeless tobacco: Never Used   Substance Use Topics     Alcohol use: No     History reviewed. No pertinent family history.      No Known Allergies    ROS:  Constitutional, HEENT, cardiovascular, pulmonary, GI, , musculoskeletal, neuro, skin, endocrine and psych systems are negative, except as otherwise noted.    OBJECTIVE:     /71   Pulse 67   Temp 98.6  F (37  C) (Oral)   Resp 14   " Ht 1.765 m (5' 9.5\")   Wt 73 kg (161 lb)   SpO2 99%   BMI 23.43 kg/m    Body mass index is 23.43 kg/m .  GENERAL: healthy, alert and no distress  MS: no gross musculoskeletal defects noted, no edema  SKIN: no suspicious lesions or rashes  PSYCH: mentation appears normal, affect normal/bright, judgement and insight intact and appearance well groomed    Diagnostic Test Results:  none     ASSESSMENT/PLAN:       1. Moderate episode of recurrent major depressive disorder (H)  He is making good progress with the sertraline and reports no side effects. He will increase to the 100 mg dose and follow up in 3 months.   Continue with counseling.   If he has concerns, he will notify in the meantime.   - sertraline (ZOLOFT) 100 MG tablet; 1 tablet orally daily  Dispense: 90 tablet; Refill: 1        Kristen M. Kehr, PA-C  [facility]  "

## 2019-01-02 NOTE — LETTER
My Depression Action Plan  Name: Raul Aviles   Date of Birth 1985  Date: 1/2/2019    My doctor: [facility]   My clinic: [facility] [place]  [address] [facility] [address] [phone]          GREEN    ZONE   Good Control    What it looks like:     Things are going generally well. You have normal up s and down s. You may even feel depressed from time to time, but bad moods usually last less than a day.   What you need to do:  1. Continue to care for yourself (see self care plan)  2. Check your depression survival kit and update it as needed  3. Follow your physician s recommendations including any medication.  4. Do not stop taking medication unless you consult with your physician first.           YELLOW         ZONE Getting Worse    What it looks like:     Depression is starting to interfere with your life.     It may be hard to get out of bed; you may be starting to isolate yourself from others.    Symptoms of depression are starting to last most all day and this has happened for several days.     You may have suicidal thoughts but they are not constant.   What you need to do:     1. Call your care team, your response to treatment will improve if you keep your care team informed of your progress. Yellow periods are signs an adjustment may need to be made.     2. Continue your self-care, even if you have to fake it!    3. Talk to someone in your support network    4. Open up your depression survival kit           RED    ZONE Medical Alert - Get Help    What it looks like:     Depression is seriously interfering with your life.     You may experience these or other symptoms: You can t get out of bed most days, can t work or engage in other necessary activities, you have trouble taking care of basic hygiene, or basic responsibilities, thoughts of suicide or death that will not go away, self-injurious behavior.     What you need to do:  1. Call your care team and  request a same-day appointment. If they are not available (weekends or after hours) call your local crisis line, emergency room or 911.            Depression Self Care Plan / Survival Kit    Self-Care for Depression  Here s the deal. Your body and mind are really not as separate as most people think.  What you do and think affects how you feel and how you feel influences what you do and think. This means if you do things that people who feel good do, it will help you feel better.  Sometimes this is all it takes.  There is also a place for medication and therapy depending on how severe your depression is, so be sure to consult with your medical provider and/ or Behavioral Health Consultant if your symptoms are worsening or not improving.     In order to better manage my stress, I will:    Exercise  Get some form of exercise, every day. This will help reduce pain and release endorphins, the  feel good  chemicals in your brain. This is almost as good as taking antidepressants!  This is not the same as joining a gym and then never going! (they count on that by the way ) It can be as simple as just going for a walk or doing some gardening, anything that will get you moving.      Hygiene   Maintain good hygiene (Get out of bed in the morning, Make your bed, Brush your teeth, Take a shower, and Get dressed like you were going to work, even if you are unemployed).  If your clothes don't fit try to get ones that do.    Diet  I will strive to eat foods that are good for me, drink plenty of water, and avoid excessive sugar, caffeine, alcohol, and other mood-altering substances.  Some foods that are helpful in depression are: complex carbohydrates, B vitamins, flaxseed, fish or fish oil, fresh fruits and vegetables.    Psychotherapy  I agree to participate in Individual Therapy (if recommended).    Medication  If prescribed medications, I agree to take them.  Missing doses can result in serious side effects.  I understand that  drinking alcohol, or other illicit drug use, may cause potential side effects.  I will not stop my medication abruptly without first discussing it with my provider.    Staying Connected With Others  I will stay in touch with my friends, family members, and my primary care provider/team.    Use your imagination  Be creative.  We all have a creative side; it doesn t matter if it s oil painting, sand castles, or mud pies! This will also kick up the endorphins.    Witness Beauty  (AKA stop and smell the roses) Take a look outside, even in mid-winter. Notice colors, textures. Watch the squirrels and birds.     Service to others  Be of service to others.  There is always someone else in need.  By helping others we can  get out of ourselves  and remember the really important things.  This also provides opportunities for practicing all the other parts of the program.    Humor  Laugh and be silly!  Adjust your TV habits for less news and crime-drama and more comedy.    Control your stress  Try breathing deep, massage therapy, biofeedback, and meditation. Find time to relax each day.     My support system    Clinic Contact:  Phone number:    Contact 1:  Phone number:    Contact 2:  Phone number:    Sabianism/:  Phone number:    Therapist:  Phone number:    Local crisis center:    Phone number:    Other community support:  Phone number:

## 2019-01-03 ASSESSMENT — ANXIETY QUESTIONNAIRES: GAD7 TOTAL SCORE: 4

## 2019-04-01 ENCOUNTER — OFFICE VISIT (OUTPATIENT)
Dept: FAMILY MEDICINE | Facility: CLINIC | Age: 34
End: 2019-04-01
Payer: COMMERCIAL

## 2019-04-01 VITALS
TEMPERATURE: 98.3 F | DIASTOLIC BLOOD PRESSURE: 70 MMHG | OXYGEN SATURATION: 99 % | WEIGHT: 180 LBS | HEART RATE: 57 BPM | SYSTOLIC BLOOD PRESSURE: 112 MMHG | BODY MASS INDEX: 26.2 KG/M2

## 2019-04-01 DIAGNOSIS — Z13.1 SCREENING FOR DIABETES MELLITUS: ICD-10-CM

## 2019-04-01 DIAGNOSIS — Z13.220 LIPID SCREENING: ICD-10-CM

## 2019-04-01 DIAGNOSIS — F33.1 MODERATE EPISODE OF RECURRENT MAJOR DEPRESSIVE DISORDER (H): Primary | ICD-10-CM

## 2019-04-01 PROCEDURE — 99213 OFFICE O/P EST LOW 20 MIN: CPT | Performed by: PHYSICIAN ASSISTANT

## 2019-04-01 RX ORDER — SERTRALINE HYDROCHLORIDE 100 MG/1
TABLET, FILM COATED ORAL
Qty: 90 TABLET | Refills: 1 | Status: SHIPPED | OUTPATIENT
Start: 2019-04-01 | End: 2019-05-13

## 2019-04-01 RX ORDER — BUPROPION HYDROCHLORIDE 150 MG/1
150 TABLET ORAL EVERY MORNING
Qty: 90 TABLET | Refills: 1 | Status: SHIPPED | OUTPATIENT
Start: 2019-04-01 | End: 2019-05-13

## 2019-04-01 ASSESSMENT — ANXIETY QUESTIONNAIRES
IF YOU CHECKED OFF ANY PROBLEMS ON THIS QUESTIONNAIRE, HOW DIFFICULT HAVE THESE PROBLEMS MADE IT FOR YOU TO DO YOUR WORK, TAKE CARE OF THINGS AT HOME, OR GET ALONG WITH OTHER PEOPLE: SOMEWHAT DIFFICULT
1. FEELING NERVOUS, ANXIOUS, OR ON EDGE: SEVERAL DAYS
GAD7 TOTAL SCORE: 4
2. NOT BEING ABLE TO STOP OR CONTROL WORRYING: NOT AT ALL
7. FEELING AFRAID AS IF SOMETHING AWFUL MIGHT HAPPEN: NOT AT ALL
6. BECOMING EASILY ANNOYED OR IRRITABLE: SEVERAL DAYS
5. BEING SO RESTLESS THAT IT IS HARD TO SIT STILL: NOT AT ALL
3. WORRYING TOO MUCH ABOUT DIFFERENT THINGS: SEVERAL DAYS

## 2019-04-01 ASSESSMENT — PATIENT HEALTH QUESTIONNAIRE - PHQ9
SUM OF ALL RESPONSES TO PHQ QUESTIONS 1-9: 6
5. POOR APPETITE OR OVEREATING: SEVERAL DAYS

## 2019-04-01 ASSESSMENT — PAIN SCALES - GENERAL: PAINLEVEL: NO PAIN (0)

## 2019-04-01 NOTE — PROGRESS NOTES
SUBJECTIVE:   Raul Aviles is a 33 year old male who presents to clinic today for the following health issues:    He has been taking the sertraline 100 mg daily. He feels his moods have not been as good as they were when he first started taking the medication. He also has developed sexual side effects. He continues to work with the therapist every 2 weeks.     History of Present Illness     Depression & Anxiety Follow-up:     Depression/Anxiety:  Depression & Anxiety    Status since last visit::  Stable    Other associated symptoms of depression and anxiety::  None    Significant life event::  No    Current substance use::  None       Today's PHQ-9         PHQ-9 Total Score:         PHQ-9 Q9 Thoughts of better off dead/self-harm past 2 weeks :       Thoughts of suicide or self harm:      Self-harm Plan:        Self-harm Action:          Safety concerns for self or others:            Problem list and histories reviewed & adjusted, as indicated.  Additional history: as documented      Patient Active Problem List   Diagnosis     Left inguinal hernia     S/P left inguinal herniorrhaphy     Moderate episode of recurrent major depressive disorder (H)     Past Surgical History:   Procedure Laterality Date     APPENDECTOMY       HERNIORRHAPHY INGUINAL Left 4/19/2017    Procedure: HERNIORRHAPHY INGUINAL;  Open left inguinal hernia repair;  Surgeon: Nelson Baca MD;  Location: [facility] OR     ORTHOPEDIC SURGERY      R shoulder surgery     ORTHOPEDIC SURGERY      R wrist surgery     ORTHOPEDIC SURGERY      Both knee surgery       Social History     Tobacco Use     Smoking status: Never Smoker     Smokeless tobacco: Never Used   Substance Use Topics     Alcohol use: No     History reviewed. No pertinent family history.      No Known Allergies  BP Readings from Last 3 Encounters:   04/01/19 112/70   01/02/19 129/71   11/19/18 118/72    Wt Readings from Last 3 Encounters:   04/01/19 81.6 kg (180 lb)   01/02/19 73 kg (161  lb)   11/19/18 76.7 kg (169 lb)                    ROS:  Constitutional, HEENT, cardiovascular, pulmonary, gi and gu systems are negative, except as otherwise noted.    OBJECTIVE:     /70   Pulse 57   Temp 98.3  F (36.8  C) (Oral)   Wt 81.6 kg (180 lb)   SpO2 99%   BMI 26.20 kg/m    Body mass index is 26.2 kg/m .  GENERAL: healthy, alert and no distress  MS: no gross musculoskeletal defects noted, no edema  SKIN: no suspicious lesions or rashes  PSYCH: mentation appears normal, affect normal/bright    Diagnostic Test Results:  none     ASSESSMENT/PLAN:       1. Moderate episode of recurrent major depressive disorder (H)  With the sexual side effects from the sertraline, I am going to have him do the following:  Add wellbutrin  mg.   Continue with the sertraline 100 mg daily.   Continue with therapy.   He was concerned about routine health maintenance testing, he was scheduled for fasting lab tests and testosterone test prior to his next appointment.   We will discuss results at his appointment.   - buPROPion (WELLBUTRIN XL) 150 MG 24 hr tablet; Take 1 tablet (150 mg) by mouth every morning  Dispense: 90 tablet; Refill: 1  - sertraline (ZOLOFT) 100 MG tablet; 1 tablet orally daily  Dispense: 90 tablet; Refill: 1  - **Testosterone Free and Total FUTURE anytime; Future    2. Lipid screening  - Lipid panel reflex to direct LDL Fasting; Future    3. Screening for diabetes mellitus  - Glucose FUTURE anytime; Future    20 minutes spent with Raul today. Over 50% of time taken for discussion of above, counseling and coordination of care.       Kristen M. Kehr, PA-C  [facility]

## 2019-04-01 NOTE — NURSING NOTE
"Chief Complaint   Patient presents with     Depression     Anxiety       Initial /70   Pulse 57   Temp 98.3  F (36.8  C) (Oral)   Wt 81.6 kg (180 lb)   SpO2 99%   BMI 26.20 kg/m   Estimated body mass index is 26.2 kg/m  as calculated from the following:    Height as of 1/2/19: 1.765 m (5' 9.5\").    Weight as of this encounter: 81.6 kg (180 lb).  Medication Reconciliation: complete    SARAH Camacho MA    "

## 2019-04-02 ASSESSMENT — ANXIETY QUESTIONNAIRES: GAD7 TOTAL SCORE: 4

## 2019-05-06 DIAGNOSIS — Z13.220 LIPID SCREENING: ICD-10-CM

## 2019-05-06 DIAGNOSIS — F33.1 MODERATE EPISODE OF RECURRENT MAJOR DEPRESSIVE DISORDER (H): ICD-10-CM

## 2019-05-06 DIAGNOSIS — Z13.1 SCREENING FOR DIABETES MELLITUS: ICD-10-CM

## 2019-05-06 LAB
CHOLEST SERPL-MCNC: 191 MG/DL
GLUCOSE SERPL-MCNC: 91 MG/DL (ref 70–99)
HDLC SERPL-MCNC: 62 MG/DL
LDLC SERPL CALC-MCNC: 117 MG/DL
NONHDLC SERPL-MCNC: 129 MG/DL
TRIGL SERPL-MCNC: 59 MG/DL

## 2019-05-06 PROCEDURE — 84270 ASSAY OF SEX HORMONE GLOBUL: CPT | Performed by: PHYSICIAN ASSISTANT

## 2019-05-06 PROCEDURE — 84403 ASSAY OF TOTAL TESTOSTERONE: CPT | Performed by: PHYSICIAN ASSISTANT

## 2019-05-06 PROCEDURE — 36415 COLL VENOUS BLD VENIPUNCTURE: CPT | Performed by: PHYSICIAN ASSISTANT

## 2019-05-06 PROCEDURE — 82947 ASSAY GLUCOSE BLOOD QUANT: CPT | Performed by: PHYSICIAN ASSISTANT

## 2019-05-06 PROCEDURE — 80061 LIPID PANEL: CPT | Performed by: PHYSICIAN ASSISTANT

## 2019-05-08 LAB
SHBG SERPL-SCNC: 51 NMOL/L (ref 11–80)
TESTOST FREE SERPL-MCNC: 12.66 NG/DL (ref 4.7–24.4)
TESTOST SERPL-MCNC: 746 NG/DL (ref 240–950)

## 2019-05-13 ENCOUNTER — OFFICE VISIT (OUTPATIENT)
Dept: FAMILY MEDICINE | Facility: CLINIC | Age: 34
End: 2019-05-13
Payer: COMMERCIAL

## 2019-05-13 VITALS
SYSTOLIC BLOOD PRESSURE: 107 MMHG | DIASTOLIC BLOOD PRESSURE: 70 MMHG | OXYGEN SATURATION: 99 % | HEART RATE: 56 BPM | BODY MASS INDEX: 25.76 KG/M2 | TEMPERATURE: 98.1 F | WEIGHT: 177 LBS

## 2019-05-13 DIAGNOSIS — F33.1 MODERATE EPISODE OF RECURRENT MAJOR DEPRESSIVE DISORDER (H): ICD-10-CM

## 2019-05-13 PROCEDURE — 99213 OFFICE O/P EST LOW 20 MIN: CPT | Performed by: PHYSICIAN ASSISTANT

## 2019-05-13 RX ORDER — SERTRALINE HYDROCHLORIDE 100 MG/1
TABLET, FILM COATED ORAL
Qty: 90 TABLET | Refills: 1 | Status: SHIPPED | OUTPATIENT
Start: 2019-05-13 | End: 2019-10-06

## 2019-05-13 RX ORDER — BUPROPION HYDROCHLORIDE 150 MG/1
150 TABLET ORAL EVERY MORNING
Qty: 90 TABLET | Refills: 1 | Status: SHIPPED | OUTPATIENT
Start: 2019-05-13 | End: 2019-10-06

## 2019-05-13 ASSESSMENT — ANXIETY QUESTIONNAIRES
1. FEELING NERVOUS, ANXIOUS, OR ON EDGE: NOT AT ALL
7. FEELING AFRAID AS IF SOMETHING AWFUL MIGHT HAPPEN: NOT AT ALL
3. WORRYING TOO MUCH ABOUT DIFFERENT THINGS: SEVERAL DAYS
IF YOU CHECKED OFF ANY PROBLEMS ON THIS QUESTIONNAIRE, HOW DIFFICULT HAVE THESE PROBLEMS MADE IT FOR YOU TO DO YOUR WORK, TAKE CARE OF THINGS AT HOME, OR GET ALONG WITH OTHER PEOPLE: SOMEWHAT DIFFICULT
5. BEING SO RESTLESS THAT IT IS HARD TO SIT STILL: NOT AT ALL
GAD7 TOTAL SCORE: 3
6. BECOMING EASILY ANNOYED OR IRRITABLE: SEVERAL DAYS
2. NOT BEING ABLE TO STOP OR CONTROL WORRYING: NOT AT ALL

## 2019-05-13 ASSESSMENT — PAIN SCALES - GENERAL: PAINLEVEL: NO PAIN (0)

## 2019-05-13 ASSESSMENT — PATIENT HEALTH QUESTIONNAIRE - PHQ9
SUM OF ALL RESPONSES TO PHQ QUESTIONS 1-9: 6
5. POOR APPETITE OR OVEREATING: SEVERAL DAYS

## 2019-05-13 NOTE — PATIENT INSTRUCTIONS
Cut the sertraline in 1/2 to 50 mg dose and stay for a month along with the wellbutrin.     Evenutally, you can try to wean off of the sertraline and just stay with the wellbutrin

## 2019-05-13 NOTE — NURSING NOTE
"Chief Complaint   Patient presents with     Depression     Anxiety     Results     lab       Initial /70   Pulse 56   Temp 98.1  F (36.7  C) (Oral)   Wt 80.3 kg (177 lb)   SpO2 99%   BMI 25.76 kg/m   Estimated body mass index is 25.76 kg/m  as calculated from the following:    Height as of 1/2/19: 1.765 m (5' 9.5\").    Weight as of this encounter: 80.3 kg (177 lb).  Medication Reconciliation: complete    SARAH Camacho MA    "

## 2019-05-13 NOTE — LETTER
My Depression Action Plan  Name: Raul Aviles   Date of Birth 1985  Date: 5/9/2019    My doctor: [facility] [place]   My clinic: [facility]  [address] [facility] [address] [phone]          GREEN    ZONE   Good Control    What it looks like:     Things are going generally well. You have normal up s and down s. You may even feel depressed from time to time, but bad moods usually last less than a day.   What you need to do:  1. Continue to care for yourself (see self care plan)  2. Check your depression survival kit and update it as needed  3. Follow your physician s recommendations including any medication.  4. Do not stop taking medication unless you consult with your physician first.           YELLOW         ZONE Getting Worse    What it looks like:     Depression is starting to interfere with your life.     It may be hard to get out of bed; you may be starting to isolate yourself from others.    Symptoms of depression are starting to last most all day and this has happened for several days.     You may have suicidal thoughts but they are not constant.   What you need to do:     1. Call your care team, your response to treatment will improve if you keep your care team informed of your progress. Yellow periods are signs an adjustment may need to be made.     2. Continue your self-care, even if you have to fake it!    3. Talk to someone in your support network    4. Open up your depression survival kit           RED    ZONE Medical Alert - Get Help    What it looks like:     Depression is seriously interfering with your life.     You may experience these or other symptoms: You can t get out of bed most days, can t work or engage in other necessary activities, you have trouble taking care of basic hygiene, or basic responsibilities, thoughts of suicide or death that will not go away, self-injurious behavior.     What you need to do:  1. Call your care team and  request a same-day appointment. If they are not available (weekends or after hours) call your local crisis line, emergency room or 911.            Depression Self Care Plan / Survival Kit    Self-Care for Depression  Here s the deal. Your body and mind are really not as separate as most people think.  What you do and think affects how you feel and how you feel influences what you do and think. This means if you do things that people who feel good do, it will help you feel better.  Sometimes this is all it takes.  There is also a place for medication and therapy depending on how severe your depression is, so be sure to consult with your medical provider and/ or Behavioral Health Consultant if your symptoms are worsening or not improving.     In order to better manage my stress, I will:    Exercise  Get some form of exercise, every day. This will help reduce pain and release endorphins, the  feel good  chemicals in your brain. This is almost as good as taking antidepressants!  This is not the same as joining a gym and then never going! (they count on that by the way ) It can be as simple as just going for a walk or doing some gardening, anything that will get you moving.      Hygiene   Maintain good hygiene (Get out of bed in the morning, Make your bed, Brush your teeth, Take a shower, and Get dressed like you were going to work, even if you are unemployed).  If your clothes don't fit try to get ones that do.    Diet  I will strive to eat foods that are good for me, drink plenty of water, and avoid excessive sugar, caffeine, alcohol, and other mood-altering substances.  Some foods that are helpful in depression are: complex carbohydrates, B vitamins, flaxseed, fish or fish oil, fresh fruits and vegetables.    Psychotherapy  I agree to participate in Individual Therapy (if recommended).    Medication  If prescribed medications, I agree to take them.  Missing doses can result in serious side effects.  I understand that  drinking alcohol, or other illicit drug use, may cause potential side effects.  I will not stop my medication abruptly without first discussing it with my provider.    Staying Connected With Others  I will stay in touch with my friends, family members, and my primary care provider/team.    Use your imagination  Be creative.  We all have a creative side; it doesn t matter if it s oil painting, sand castles, or mud pies! This will also kick up the endorphins.    Witness Beauty  (AKA stop and smell the roses) Take a look outside, even in mid-winter. Notice colors, textures. Watch the squirrels and birds.     Service to others  Be of service to others.  There is always someone else in need.  By helping others we can  get out of ourselves  and remember the really important things.  This also provides opportunities for practicing all the other parts of the program.    Humor  Laugh and be silly!  Adjust your TV habits for less news and crime-drama and more comedy.    Control your stress  Try breathing deep, massage therapy, biofeedback, and meditation. Find time to relax each day.     My support system    Clinic Contact:  Phone number:    Contact 1:  Phone number:    Contact 2:  Phone number:    Sikh/:  Phone number:    Therapist:  Phone number:    Local crisis center:    Phone number:    Other community support:  Phone number:

## 2019-05-13 NOTE — PROGRESS NOTES
SUBJECTIVE:   Raul Aviles is a 33 year old male who presents to clinic today for the following health issues:    Raul added the wellbutrin last month. He feels it has been helpful for the sexual side effects. He now feels that his moods are flat. Low energy, increased appetite are also noticed.     History of Present Illness     Depression & Anxiety Follow-up:     Depression/Anxiety:  Depression & Anxiety    Status since last visit::  Stable    Other associated symptoms of depression and anxiety::  None    Significant life event::  No    Current substance use::  None       Today's PHQ-9         PHQ-9 Total Score:         PHQ-9 Q9 Thoughts of better off dead/self-harm past 2 weeks :       Thoughts of suicide or self harm:      Self-harm Plan:        Self-harm Action:          Safety concerns for self or others:            Additional history: as documented    Reviewed and updated as needed this visit by clinical staff         Reviewed and updated as needed this visit by Provider           Patient Active Problem List   Diagnosis     Left inguinal hernia     S/P left inguinal herniorrhaphy     Moderate episode of recurrent major depressive disorder (H)     Past Surgical History:   Procedure Laterality Date     APPENDECTOMY       HERNIORRHAPHY INGUINAL Left 4/19/2017    Procedure: HERNIORRHAPHY INGUINAL;  Open left inguinal hernia repair;  Surgeon: Nelson Baca MD;  Location: [facility] OR     ORTHOPEDIC SURGERY      R shoulder surgery     ORTHOPEDIC SURGERY      R wrist surgery     ORTHOPEDIC SURGERY      Both knee surgery       Social History     Tobacco Use     Smoking status: Never Smoker     Smokeless tobacco: Never Used   Substance Use Topics     Alcohol use: No     History reviewed. No pertinent family history.      Current Outpatient Medications   Medication Sig Dispense Refill     buPROPion (WELLBUTRIN XL) 150 MG 24 hr tablet Take 1 tablet (150 mg) by mouth every morning 90 tablet 1     sertraline  (ZOLOFT) 100 MG tablet 1 tablet orally daily 90 tablet 1     No Known Allergies  BP Readings from Last 3 Encounters:   05/13/19 107/70   04/01/19 112/70   01/02/19 129/71    Wt Readings from Last 3 Encounters:   05/13/19 80.3 kg (177 lb)   04/01/19 81.6 kg (180 lb)   01/02/19 73 kg (161 lb)                    ROS:  Constitutional, HEENT, cardiovascular, pulmonary, GI, , musculoskeletal, neuro, skin, endocrine and psych systems are negative, except as otherwise noted.    OBJECTIVE:     /70   Pulse 56   Temp 98.1  F (36.7  C) (Oral)   Wt 80.3 kg (177 lb)   SpO2 99%   BMI 25.76 kg/m    Body mass index is 25.76 kg/m .  GENERAL: healthy, alert and no distress  PSYCH: mentation appears normal, affect flat, judgement and insight intact and appearance well groomed    Diagnostic Test Results:  none     ASSESSMENT/PLAN:       1. Moderate episode of recurrent major depressive disorder (H)  He is going to cut the sertraline to 50 mg daily or 1/2 tablet. Continue with the wellbutrin. He may also try to wean off of the sertraline after a month, but will let me know via Mychart. I will plan to see him back in 4-5 months, sooner if needed.   - sertraline (ZOLOFT) 100 MG tablet; 1 tablet orally daily  Dispense: 90 tablet; Refill: 1  - buPROPion (WELLBUTRIN XL) 150 MG 24 hr tablet; Take 1 tablet (150 mg) by mouth every morning  Dispense: 90 tablet; Refill: 1    20 minutes spent with Raul today. Over 50% of time taken for discussion of above, counseling and coordination of care.       Kristen M. Kehr, PA-C  [facility]

## 2019-05-14 ASSESSMENT — ANXIETY QUESTIONNAIRES: GAD7 TOTAL SCORE: 3

## 2019-10-06 ENCOUNTER — MYC REFILL (OUTPATIENT)
Dept: FAMILY MEDICINE | Facility: CLINIC | Age: 34
End: 2019-10-06

## 2019-10-06 DIAGNOSIS — F33.1 MODERATE EPISODE OF RECURRENT MAJOR DEPRESSIVE DISORDER (H): ICD-10-CM

## 2019-10-07 RX ORDER — SERTRALINE HYDROCHLORIDE 100 MG/1
TABLET, FILM COATED ORAL
Qty: 30 TABLET | Refills: 0 | Status: SHIPPED | OUTPATIENT
Start: 2019-10-07 | End: 2019-10-14

## 2019-10-07 RX ORDER — BUPROPION HYDROCHLORIDE 150 MG/1
150 TABLET ORAL EVERY MORNING
Qty: 30 TABLET | Refills: 0 | Status: SHIPPED | OUTPATIENT
Start: 2019-10-07 | End: 2019-10-14

## 2019-10-14 ENCOUNTER — OFFICE VISIT (OUTPATIENT)
Dept: FAMILY MEDICINE | Facility: CLINIC | Age: 34
End: 2019-10-14
Payer: COMMERCIAL

## 2019-10-14 VITALS
WEIGHT: 184 LBS | TEMPERATURE: 98.3 F | BODY MASS INDEX: 26.78 KG/M2 | DIASTOLIC BLOOD PRESSURE: 79 MMHG | SYSTOLIC BLOOD PRESSURE: 123 MMHG | OXYGEN SATURATION: 98 % | HEART RATE: 64 BPM

## 2019-10-14 DIAGNOSIS — F33.1 MODERATE EPISODE OF RECURRENT MAJOR DEPRESSIVE DISORDER (H): ICD-10-CM

## 2019-10-14 PROCEDURE — 99213 OFFICE O/P EST LOW 20 MIN: CPT | Performed by: PHYSICIAN ASSISTANT

## 2019-10-14 PROCEDURE — 99207 C PAF COMPLETED  NO CHARGE: CPT | Performed by: PHYSICIAN ASSISTANT

## 2019-10-14 RX ORDER — SERTRALINE HYDROCHLORIDE 100 MG/1
TABLET, FILM COATED ORAL
Qty: 135 TABLET | Refills: 1 | Status: SHIPPED | OUTPATIENT
Start: 2019-10-14 | End: 2020-04-21

## 2019-10-14 RX ORDER — BUPROPION HYDROCHLORIDE 150 MG/1
150 TABLET ORAL EVERY MORNING
Qty: 90 TABLET | Refills: 1 | Status: SHIPPED | OUTPATIENT
Start: 2019-10-14 | End: 2020-04-21

## 2019-10-14 ASSESSMENT — PATIENT HEALTH QUESTIONNAIRE - PHQ9
10. IF YOU CHECKED OFF ANY PROBLEMS, HOW DIFFICULT HAVE THESE PROBLEMS MADE IT FOR YOU TO DO YOUR WORK, TAKE CARE OF THINGS AT HOME, OR GET ALONG WITH OTHER PEOPLE: SOMEWHAT DIFFICULT
SUM OF ALL RESPONSES TO PHQ QUESTIONS 1-9: 6
SUM OF ALL RESPONSES TO PHQ QUESTIONS 1-9: 6

## 2019-10-14 ASSESSMENT — ANXIETY QUESTIONNAIRES
7. FEELING AFRAID AS IF SOMETHING AWFUL MIGHT HAPPEN: NOT AT ALL
GAD7 TOTAL SCORE: 5
3. WORRYING TOO MUCH ABOUT DIFFERENT THINGS: SEVERAL DAYS
6. BECOMING EASILY ANNOYED OR IRRITABLE: SEVERAL DAYS
GAD7 TOTAL SCORE: 5
7. FEELING AFRAID AS IF SOMETHING AWFUL MIGHT HAPPEN: NOT AT ALL
2. NOT BEING ABLE TO STOP OR CONTROL WORRYING: SEVERAL DAYS
1. FEELING NERVOUS, ANXIOUS, OR ON EDGE: SEVERAL DAYS
4. TROUBLE RELAXING: SEVERAL DAYS
GAD7 TOTAL SCORE: 5
5. BEING SO RESTLESS THAT IT IS HARD TO SIT STILL: NOT AT ALL

## 2019-10-14 ASSESSMENT — PAIN SCALES - GENERAL: PAINLEVEL: NO PAIN (0)

## 2019-10-14 NOTE — PROGRESS NOTES
Subjective     Raul Aviles is a 34 year old male who presents to clinic today for the following health issues:    He continues to take the wellbutrin and the sertraline. He is back in school and having a hard time with concentration and anxiety. Overall, the medications have been working well together.     History of Present Illness        Mental Health Follow-up:  Patient presents to follow-up on Depression & Anxiety.Patient's depression since last visit has been:  Better  The patient is not having other symptoms associated with depression.  Patient's anxiety since last visit has been:  Better  The patient is not having other symptoms associated with anxiety.  Any significant life events: financial concerns  Patient is not feeling anxious or having panic attacks.  Patient has no concerns about alcohol or drug use.     Social History  Tobacco Use    Smoking status: Never Smoker    Smokeless tobacco: Never Used  Alcohol use: No  Drug use: No      Today's PHQ-9         PHQ-9 Total Score:     (P) 6   PHQ-9 Q9 Thoughts of better off dead/self-harm past 2 weeks :   (P) Not at all   Thoughts of suicide or self harm:      Self-harm Plan:        Self-harm Action:          Safety concerns for self or others:              Patient Active Problem List   Diagnosis     Left inguinal hernia     S/P left inguinal herniorrhaphy     Moderate episode of recurrent major depressive disorder (H)     Past Surgical History:   Procedure Laterality Date     APPENDECTOMY       HERNIORRHAPHY INGUINAL Left 4/19/2017    Procedure: HERNIORRHAPHY INGUINAL;  Open left inguinal hernia repair;  Surgeon: Nelson Baca MD;  Location:  OR     ORTHOPEDIC SURGERY      R shoulder surgery     ORTHOPEDIC SURGERY      R wrist surgery     ORTHOPEDIC SURGERY      Both knee surgery       Social History     Tobacco Use     Smoking status: Never Smoker     Smokeless tobacco: Never Used   Substance Use Topics     Alcohol use: No     History reviewed.  No pertinent family history.      No Known Allergies  BP Readings from Last 3 Encounters:   10/14/19 123/79   05/13/19 107/70   04/01/19 112/70    Wt Readings from Last 3 Encounters:   10/14/19 83.5 kg (184 lb)   05/13/19 80.3 kg (177 lb)   04/01/19 81.6 kg (180 lb)                    Reviewed and updated as needed this visit by Provider  Tobacco  Allergies  Meds  Problems  Med Hx  Surg Hx  Fam Hx         Review of Systems   ROS COMP: Constitutional, HEENT, cardiovascular, pulmonary, GI, , musculoskeletal, neuro, skin, endocrine and psych systems are negative, except as otherwise noted.      Objective    /79   Pulse 64   Temp 98.3  F (36.8  C) (Oral)   Wt 83.5 kg (184 lb)   SpO2 98%   BMI 26.78 kg/m    Body mass index is 26.78 kg/m .  Physical Exam   GENERAL: healthy, alert and no distress  PSYCH: mentation appears normal, affect normal/bright, judgement and insight intact and appearance well groomed    Diagnostic Test Results:  Labs reviewed in Epic        Assessment & Plan     1. Moderate episode of recurrent major depressive disorder (H)  Increase the dose of the sertraline to 150 mg daily   Continue with the wellbutrin.   Plan follow up via [facility] in 6 months, sooner if needed.   Patient had concerns about costs and medial bills. I sent a referral for Care Coordination to help discuss and see if there is any help for him with assistance.   - buPROPion (WELLBUTRIN XL) 150 MG 24 hr tablet; Take 1 tablet (150 mg) by mouth every morning  Dispense: 90 tablet; Refill: 1  - sertraline (ZOLOFT) 100 MG tablet; 1.5  tablet orally daily  Dispense: 135 tablet; Refill: 1         Return in about 6 months (around 4/14/2020) for depression / anxiety okay [facility]    Kristen M. Kehr, PA-C  [facility]

## 2019-10-15 ENCOUNTER — PATIENT OUTREACH (OUTPATIENT)
Dept: CARE COORDINATION | Facility: CLINIC | Age: 34
End: 2019-10-15

## 2019-10-15 ASSESSMENT — ANXIETY QUESTIONNAIRES: GAD7 TOTAL SCORE: 5

## 2019-10-15 ASSESSMENT — PATIENT HEALTH QUESTIONNAIRE - PHQ9: SUM OF ALL RESPONSES TO PHQ QUESTIONS 1-9: 6

## 2019-10-15 NOTE — PROGRESS NOTES
Clinic Care Coordination Contact  [facility]/Voicemail- Social Work     Referral Source: PCP Financial Concerns, depression  Clinical Data: Care Coordinator Outreach    Outreach attempted x 1.  Left message on patient's voicemail with call back information and requested return call.    Plan:Care Coordinator will try to reach patient again in 1-2 business days.      ASA Martinez     [facility]   Care Coordination  [facility]

## 2019-10-15 NOTE — LETTER
[place] CARE COORDINATION - [facility]  [address] [place] [address]  [phone]     October 16, 2019    Raul Aviles  [address] [address] [address]      Dear Raul,    I am a clinic care coordinator who works with [facility]. I have been trying to reach you recently to introduce Clinic Care Coordination and to see if there was anything I could assist you with.  I wanted to introduce myself and provide you with my contact information so that you can call me with questions or concerns about your health care. Below is a description of clinic care coordination and how I can further assist you.     The clinic care coordinator is a registered nurse and/or  who understand the health care system. The goal of clinic care coordination is to help you manage your health and improve access to the [facility] system in the most efficient manner. The registered nurse can assist you in meeting your health care goals by providing education, coordinating services, and strengthening the communication among your providers. The  can assist you with financial, behavioral, psychosocial, chemical dependency, counseling, and/or psychiatric resources.    Please feel free to contact me at [phone], with any questions or concerns. We at [facility] are focused on providing you with the highest-quality healthcare experience possible and that all starts with you.     Sincerely,     Leigh Ann Boyle JAYLA  [facility] - Care Coordination  [facility]   [phone]

## 2019-10-16 NOTE — PROGRESS NOTES
Clinic Care Coordination Contact  [facility]/Voicemail    Referral Source: PCP  Clinical Data: Care Coordinator Outreach  Outreach attempted x 2.  Left message on patient's voicemail with call back information and requested return call.  Plan: Care Coordinator will send care coordination introduction letter with care coordinator contact information and explanation of care coordination services via mail. Care Coordinator will do no further outreaches at this time.    ASA Pete, [facility] - Care Coordinator   [facility]  10/16/2019   1:22 PM  [phone]

## 2019-11-02 ENCOUNTER — HEALTH MAINTENANCE LETTER (OUTPATIENT)
Age: 34
End: 2019-11-02

## 2020-04-19 DIAGNOSIS — F33.1 MODERATE EPISODE OF RECURRENT MAJOR DEPRESSIVE DISORDER (H): ICD-10-CM

## 2020-04-21 RX ORDER — SERTRALINE HYDROCHLORIDE 100 MG/1
TABLET, FILM COATED ORAL
Qty: 90 TABLET | Refills: 1 | Status: SHIPPED | OUTPATIENT
Start: 2020-04-21 | End: 2023-04-10

## 2020-04-21 RX ORDER — BUPROPION HYDROCHLORIDE 150 MG/1
TABLET ORAL
Qty: 90 TABLET | Refills: 1 | Status: SHIPPED | OUTPATIENT
Start: 2020-04-21 | End: 2023-04-10

## 2020-04-21 NOTE — TELEPHONE ENCOUNTER
PHQ 5/13/2019 10/14/2019 4/21/2020   PHQ-9 Total Score 6 6 4   Q9: Thoughts of better off dead/self-harm past 2 weeks Not at all Not at all Not at all     MATEUSZ-7 SCORE 5/13/2019 10/14/2019 4/21/2020   Total Score - 5 (mild anxiety) 3 (minimal anxiety)   Total Score 3 5 3     Prescription approved per [facility] Refill Protocol.  Linda Guajardo RN

## 2020-11-14 ENCOUNTER — HEALTH MAINTENANCE LETTER (OUTPATIENT)
Age: 35
End: 2020-11-14

## 2021-09-12 ENCOUNTER — HEALTH MAINTENANCE LETTER (OUTPATIENT)
Age: 36
End: 2021-09-12

## 2022-01-02 ENCOUNTER — HEALTH MAINTENANCE LETTER (OUTPATIENT)
Age: 37
End: 2022-01-02

## 2022-11-19 ENCOUNTER — HEALTH MAINTENANCE LETTER (OUTPATIENT)
Age: 37
End: 2022-11-19

## 2023-04-09 ENCOUNTER — HEALTH MAINTENANCE LETTER (OUTPATIENT)
Age: 38
End: 2023-04-09

## 2023-04-10 ENCOUNTER — OFFICE VISIT (OUTPATIENT)
Dept: FAMILY MEDICINE | Facility: CLINIC | Age: 38
End: 2023-04-10
Payer: COMMERCIAL

## 2023-04-10 VITALS
WEIGHT: 173 LBS | HEART RATE: 59 BPM | DIASTOLIC BLOOD PRESSURE: 58 MMHG | TEMPERATURE: 97 F | HEIGHT: 69 IN | SYSTOLIC BLOOD PRESSURE: 102 MMHG | BODY MASS INDEX: 25.62 KG/M2 | RESPIRATION RATE: 16 BRPM | OXYGEN SATURATION: 100 %

## 2023-04-10 DIAGNOSIS — M54.50 ACUTE RIGHT-SIDED LOW BACK PAIN WITHOUT SCIATICA: Primary | ICD-10-CM

## 2023-04-10 DIAGNOSIS — Z71.89 ADVANCED CARE PLANNING/COUNSELING DISCUSSION: ICD-10-CM

## 2023-04-10 PROCEDURE — 99203 OFFICE O/P NEW LOW 30 MIN: CPT | Performed by: PHYSICIAN ASSISTANT

## 2023-04-10 RX ORDER — CYCLOBENZAPRINE HCL 10 MG
5-10 TABLET ORAL 3 TIMES DAILY PRN
Qty: 30 TABLET | Refills: 0 | Status: SHIPPED | OUTPATIENT
Start: 2023-04-10

## 2023-04-10 ASSESSMENT — PATIENT HEALTH QUESTIONNAIRE - PHQ9
SUM OF ALL RESPONSES TO PHQ QUESTIONS 1-9: 1
10. IF YOU CHECKED OFF ANY PROBLEMS, HOW DIFFICULT HAVE THESE PROBLEMS MADE IT FOR YOU TO DO YOUR WORK, TAKE CARE OF THINGS AT HOME, OR GET ALONG WITH OTHER PEOPLE: NOT DIFFICULT AT ALL
SUM OF ALL RESPONSES TO PHQ QUESTIONS 1-9: 1

## 2023-04-10 ASSESSMENT — PAIN SCALES - GENERAL: PAINLEVEL: EXTREME PAIN (8)

## 2023-04-10 NOTE — PATIENT INSTRUCTIONS
Neris Carter,    Thank you for allowing [facility] to manage your care.    I am unsure of the cause of your symptoms, but this is most likely a strain of your low back.    If you develop worsening/changing symptoms at any time, please call 911 or go to the emergency department for evaluation.    I sent your prescriptions to your pharmacy.  some Voltaren cream or the generic equivalent over the counter and use as directed.      For your pain, please use Tylenol 650mg every 6 hours. You may use 400mg of ibuprofen between doses of Tylenol.     Max acetaminophen (Tylenol) 4,000mg/24 hours  Max ibuprofen 3,000mg/24 hours    For severe pain not controlled by over the counter medications, please use cyclobenzaprine/Flexeril as prescribed. Do not use this medication while driving, operating machinery, with other sedating medications, or while drinking alcohol as it will make you drowsy.    I made a PT referral, they will be calling in approximately 1 week to set up your appointment.  If you do not hear from them, please call the specialty number on your after visit summary.     Please allow 1-2 business days for our office to contact you in regards to your laboratory/radiological studies.  If not done so, I encourage you to login into [employer] ([email or web address] to review your results as well.     If you have any questions or concerns, please feel free to call us at [phone]    Sincerely,    Chalo Hatch PA-C    Did you know?      You can schedule a video visit for follow-up appointments as well as future appointments for certain conditions.  Please see the below link.     [email or web address]    If you have not already done so,  I encourage you to sign up for [employer] ([email or web address]).  This will allow you to review your results, securely communicate with a provider, and schedule virtual visits as well.

## 2023-04-10 NOTE — PROGRESS NOTES
Assessment & Plan   Problem List Items Addressed This Visit    None  Visit Diagnoses     Acute right-sided low back pain without sciatica    -  Primary    Relevant Medications    cyclobenzaprine (FLEXERIL) 10 MG tablet    Other Relevant Orders    Physical Therapy Referral    Advanced care planning/counseling discussion             I do not believe a fracture to be the source of this patient's pain as there was no preceding trauma.  Based on this, no imaging of the spine was done.    I do not believe the pain is caused by an epidural abscess as the patient denies hx of IV drug use and does not have fevers/chills and no recent procedures.    I do not believe this patient's pain is from an infiltrative vertebral tumor as the patient does not have weight loss or night sweats and no known history of cancer.    I do not believe this patient's pain represents a rupture AAA.  There is no palpable, pulsatile mass on exam and patient does not have any ABD pain.      Based on history and exam, the most likely etiology of this patient's back pain is lumbosacral strain, less likely radiculopathy.  Emergent MRI is not indicated as this patient does not have new weakness or cauda equina syndrome.  Patient has no saddle anesthesia, bowel or bladder incontinence. Will send home with otc analgesics,  muscle relaxant for breakthrough pain/spasm, rice/heat, and physical therapy referral.  Follow-up in 1 month if not improving.    Complete history and physical exam as below. Afebrile with normal vital signs.    DDx and Dx discussed with and explained to the pt to their satisfaction.  All questions were answered at this time. Pt expressed understanding of and agreement with this dx, tx, and plan. No further workup warranted and standard medication warnings given. I have given the patient a list of pertinent indications for re-evaluation. Will go to the Emergency Department if symptoms worsen or new concerning symptoms arise. Patient  "left in no apparent distress.     Prescription drug management     BMI:   Estimated body mass index is 25.92 kg/m  as calculated from the following:    Height as of this encounter: 1.74 m (5' 8.5\").    Weight as of this encounter: 78.5 kg (173 lb).     See Patient Instructions    FRANCISCO Dumont [facility] ANAHI Carter is a 37 year old, presenting for the following health issues:  Musculoskeletal Problem        4/10/2023     1:08 PM   Additional Questions   Roomed by Yeimy PONCE CMA   Accompanied by no one         4/10/2023     1:08 PM   Patient Reported Additional Medications   Patient reports taking the following new medications None     HPI     Pain History:  Yesterday worked out with julianna and felt his back get tight and later was loading things into his car and right side of low back began to hurt. History of this years ago.  When did you first notice your pain? 2 years ago, came back 4/9/23  Have you seen anyone else for your pain? Yes - chiropractor  How has your pain affected your ability to work? Can't really work  Where in your body do you have pain? Back Pain  Onset/Duration: 4/9/23  Description:   Location of pain: low back, right side  Character of pain: sharp  Pain radiation: none  New numbness or weakness in legs, not attributed to pain: no   Intensity: Currently 8/10  Progression of Symptoms: worsening and intermittent  History:   Specific cause: bending over and picked up a case of water  Pain interferes with job: YES  History of back problems: Yes  Any previous MRI or X-rays: None  Sees a specialist for back pain: No  Alleviating factors:   Improved by: None    Precipitating factors:  Worsened by: weird movements, bending over, standing up from sitting  Therapies tried and outcome: Ibu, lidocaine patch     Accompanying Signs & Symptoms:  Risk of Fracture: None  Risk of Cauda Equina: None  Risk of Infection: None  Risk of Cancer: None    Review of Systems " "  Constitutional, msk, neuro, cardiovascular, pulmonary, gi and gu systems are negative, except as otherwise noted.      Objective    /58   Pulse 59   Temp 97  F (36.1  C) (Tympanic)   Resp 16   Ht 1.74 m (5' 8.5\")   Wt 78.5 kg (173 lb)   SpO2 100%   BMI 25.92 kg/m    Body mass index is 25.92 kg/m .  Physical Exam  Vitals and nursing note reviewed.   Constitutional:       General: He is not in acute distress.     Appearance: He is not ill-appearing or diaphoretic.   HENT:      Head: Normocephalic and atraumatic.      Mouth/Throat:      Mouth: Mucous membranes are moist.   Eyes:      Conjunctiva/sclera: Conjunctivae normal.   Cardiovascular:      Rate and Rhythm: Normal rate and regular rhythm.      Heart sounds: Normal heart sounds. No murmur heard.     No friction rub. No gallop.   Pulmonary:      Effort: Pulmonary effort is normal. No respiratory distress.      Breath sounds: Normal breath sounds. No stridor. No wheezing, rhonchi or rales.   Abdominal:      General: Bowel sounds are normal. There is no distension.      Palpations: Abdomen is soft. There is no mass.      Tenderness: There is no abdominal tenderness. There is no guarding or rebound.      Hernia: No hernia is present.   Musculoskeletal:      Comments: No CVA or midline spinal tenderness. No overlying signs of trauma or infection. Mild tenderness right lumbar paraspinal muscles.   Skin:     General: Skin is warm and dry.   Neurological:      General: No focal deficit present.      Mental Status: He is alert. Mental status is at baseline.      Comments: Able to toe lift, heel walk and knee bend.   Psychiatric:         Mood and Affect: Mood normal.         Behavior: Behavior normal.          Answers for HPI/ROS submitted by the patient on 4/10/2023  If you checked off any problems, how difficult have these problems made it for you to do your work, take care of things at home, or get along with other people?: Not difficult at all  PHQ9 TOTAL " SCORE: 1

## 2023-08-20 NOTE — Clinical Note
Fax preop if needed Lab Results   Component Value Date    HGBA1C 10.2 (H) 08/10/2023     -Plan per primary team

## 2024-06-15 ENCOUNTER — HEALTH MAINTENANCE LETTER (OUTPATIENT)
Age: 39
End: 2024-06-15

## 2025-06-21 ENCOUNTER — HEALTH MAINTENANCE LETTER (OUTPATIENT)
Age: 40
End: 2025-06-21

## (undated) DEVICE — DRSG TEGADERM 4X4 3/4" 1626W

## (undated) DEVICE — DRAPE IOBAN INCISE 23X17" 6650EZ

## (undated) DEVICE — SOL ADH LIQUID BENZOIN SWAB 0.6ML C1544

## (undated) DEVICE — GLOVE PROTEXIS BLUE W/NEU-THERA 7.5  2D73EB75

## (undated) DEVICE — GLOVE PROTEXIS W/NEU-THERA 7.5  2D73TE75

## (undated) DEVICE — PREP CHLORAPREP 26ML TINTED ORANGE  260815

## (undated) DEVICE — SU VICRYL 3-0 SH 27" UND J416H

## (undated) DEVICE — SUCTION TIP YANKAUER W/O VENT K86

## (undated) DEVICE — DRAPE LAP W/ARMBOARD 29410

## (undated) DEVICE — SYR 10ML LL W/O NDL

## (undated) DEVICE — SU VICRYL 0 CT-2 27" UND J270H

## (undated) DEVICE — GLOVE PROTEXIS W/NEU-THERA 7.0  2D73TE70

## (undated) DEVICE — SOL WATER IRRIG 1000ML BOTTLE 07139-09

## (undated) DEVICE — SU MONOCRYL 4-0 PS-2 18" UND Y496G

## (undated) DEVICE — DRSG ABDOMINAL 07 1/2X8" 7197D

## (undated) DEVICE — DRSG STERI STRIP 1/2X4" R1547

## (undated) DEVICE — BNDG ABDOMINAL BINDER 09X46-62"

## (undated) DEVICE — PACK MINOR SBA15MIFSE

## (undated) DEVICE — DRAIN PENROSE 0.25"X18" LATEX FREE GR201